# Patient Record
Sex: FEMALE | Race: WHITE
[De-identification: names, ages, dates, MRNs, and addresses within clinical notes are randomized per-mention and may not be internally consistent; named-entity substitution may affect disease eponyms.]

---

## 2018-08-13 ENCOUNTER — HOSPITAL ENCOUNTER (EMERGENCY)
Dept: HOSPITAL 97 - ER | Age: 28
Discharge: HOME | End: 2018-08-13
Payer: SELF-PAY

## 2018-08-13 DIAGNOSIS — R10.11: Primary | ICD-10-CM

## 2018-08-13 DIAGNOSIS — F31.9: ICD-10-CM

## 2018-08-13 DIAGNOSIS — Z88.2: ICD-10-CM

## 2018-08-13 DIAGNOSIS — F17.210: ICD-10-CM

## 2018-08-13 LAB
ALBUMIN SERPL BCP-MCNC: 3.7 G/DL (ref 3.4–5)
ALP SERPL-CCNC: 61 U/L (ref 45–117)
ALT SERPL W P-5'-P-CCNC: 25 U/L (ref 12–78)
AMYLASE SERPL-CCNC: 43 U/L (ref 25–115)
AST SERPL W P-5'-P-CCNC: 19 U/L (ref 15–37)
BUN BLD-MCNC: 18 MG/DL (ref 7–18)
GLUCOSE SERPLBLD-MCNC: 91 MG/DL (ref 74–106)
HCT VFR BLD CALC: 37.9 % (ref 36–45)
LIPASE SERPL-CCNC: 94 U/L (ref 73–393)
LYMPHOCYTES # SPEC AUTO: 1.4 K/UL (ref 0.7–4.9)
MCH RBC QN AUTO: 30.7 PG (ref 27–35)
MCV RBC: 89.1 FL (ref 80–100)
PMV BLD: 10.5 FL (ref 7.6–11.3)
POTASSIUM SERPL-SCNC: 3.9 MMOL/L (ref 3.5–5.1)
RBC # BLD: 4.25 M/UL (ref 3.86–4.86)

## 2018-08-13 PROCEDURE — 83690 ASSAY OF LIPASE: CPT

## 2018-08-13 PROCEDURE — 99284 EMERGENCY DEPT VISIT MOD MDM: CPT

## 2018-08-13 PROCEDURE — 81025 URINE PREGNANCY TEST: CPT

## 2018-08-13 PROCEDURE — 76705 ECHO EXAM OF ABDOMEN: CPT

## 2018-08-13 PROCEDURE — 36415 COLL VENOUS BLD VENIPUNCTURE: CPT

## 2018-08-13 PROCEDURE — 85025 COMPLETE CBC W/AUTO DIFF WBC: CPT

## 2018-08-13 PROCEDURE — 96374 THER/PROPH/DIAG INJ IV PUSH: CPT

## 2018-08-13 PROCEDURE — 81003 URINALYSIS AUTO W/O SCOPE: CPT

## 2018-08-13 PROCEDURE — 80076 HEPATIC FUNCTION PANEL: CPT

## 2018-08-13 PROCEDURE — 80048 BASIC METABOLIC PNL TOTAL CA: CPT

## 2018-08-13 PROCEDURE — 82150 ASSAY OF AMYLASE: CPT

## 2018-08-13 NOTE — ER
Nurse's Notes                                                                                     

 [facility]                                                                

Name: Rigoberto Faye                                                                               

Age: 28 yrs                                                                                       

Sex: Female                                                                                       

: 1990                                                                                   

MRN: [id number]                                                                                   

Arrival Date: 2018                                                                          

Time: 09:44                                                                                       

Account#: [id number]                                                                            

Bed 27                                                                                            

Private MD: None, None                                                                            

Diagnosis: Upper abdominal pain, unspecified                                                      

                                                                                                  

Presentation:                                                                                     

                                                                                             

09:47 Presenting complaint: Patient states: C/o RUQ pain x 3 days, ate sushi on Friday and it jl7 

      started hurting after that. Denies N/V, reports diarrhea on Saturday morning, none          

      since then. Pt noted to be drinking soda and eating donuts, instructed not to eat or        

      drink anything else until the ERP says it's ok. Transition of care: patient was not         

      received from another setting of care. Onset of symptoms was August 10, 2018. Risk          

      Assessment: Do you want to hurt yourself or someone else? Patient reports no desire to      

      harm self or others. Initial Sepsis Screen: Does the patient meet any 2 criteria? No.       

      Patient's initial sepsis screen is negative. Does the patient have a suspected source       

      of infection? No. Patient's initial sepsis screen is negative. Care prior to arrival:       

      None.                                                                                       

09:47 Method Of Arrival: Ambulatory                                                           [facility] 

09:47 Acuity: ANGELES 3                                                                           jl7 

                                                                                                  

OB/GYN:                                                                                           

09:53 LMP 2018                                                                            [facility] 

                                                                                                  

Historical:                                                                                       

- Allergies:                                                                                      

09:53 Sulfa (Sulfonamide Antibiotics);                                                        jl7 

- Home Meds:                                                                                      

09:53 oxcarbazepine oral oral [Active];                                                       jl7 

- PMHx:                                                                                           

09:53 Bipolar disorder;                                                                       jl7 

- PSHx:                                                                                           

09:53 None;                                                                                   jl7 

                                                                                                  

- Immunization history:: Adult Immunizations up to date.                                          

- Social history:: Smoking status: Patient uses tobacco products, smokes one pack                 

  cigarettes per day.                                                                             

- Ebola Screening: : No symptoms or risks identified at this time.                                

                                                                                                  

                                                                                                  

Screening:                                                                                        

10:33 Abuse screen: Denies threats or abuse. Denies injuries from another. Nutritional        aj  

      screening: No deficits noted. Tuberculosis screening: No symptoms or risk factors           

      identified. Fall Risk None identified.                                                      

                                                                                                  

Assessment:                                                                                       

10:14 General: Appears in no apparent distress. comfortable, Behavior is calm, cooperative,   aj  

      appropriate for age. Pain: Complains of pain in epigastric area and right upper             

      quadrant. Neuro: Level of Consciousness is awake, alert, obeys commands, Oriented to        

      person, place, time, situation, Appropriate for age. Respiratory: Airway is patent          

      Respiratory effort is even, unlabored, Respiratory pattern is regular, symmetrical. GI:     

      Abdomen is flat, Bowel sounds present X 4 quads. Abd is soft and non tender X 4 quads.      

      Derm: Skin is intact, is healthy with good turgor, Skin is pink, warm \T\ dry. normal.      

12:09 Reassessment: Patient appears in no apparent distress at this time. No changes from     aj  

      previously documented assessment. Patient and/or family updated on plan of care and         

      expected duration. Pain level reassessed. Patient is alert, oriented x 3, equal             

      unlabored respirations, skin warm/dry/pink. Patient states feeling better. Patient          

      states symptoms have improved.                                                              

                                                                                                  

Vital Signs:                                                                                      

09:53  / 77; Pulse 90; Resp 16 S; Temp 98.3(O); Pulse Ox 99% on R/A; Weight 68.04 kg    jl7 

      (R); Height 5 ft. 10 in. (177.80 cm) (R); Pain 7/10;                                        

11:33  / 83; Pulse 86; Resp 19; Pulse Ox 99% on R/A;                                    aj  

09:53 Body Mass Index 21.52 (68.04 kg, 177.80 cm)                                             jl7 

                                                                                                  

ED Course:                                                                                        

09:44 Patient arrived in ED.                                                                  mr  

09:44 None, None is Private Physician.                                                        mr  

09:52 Triage completed.                                                                       jl7 

09:53 Arm band placed on right wrist.                                                         jl7 

10:03 [facility]                                                        kb  

10:03 Lobito Crews MD is Attending Physician.                                             kb  

10:14 Vanesa Watkins, RN is Primary Nurse.                                                     aj  

10:27 Ultrasound completed.                                                                   hr  

10:31 US Abdomen Limited In Process Unspecified.                                              EDMS

10:33 Patient has correct armband on for positive identification.                             aj  

10:33 Inserted saline lock: 22 gauge in right forearm, using aseptic technique. Blood         aj  

      collected.                                                                                  

11:26 Urine Pregnancy--Ancillary (enter results) Sent.                                        aj  

12:09 No provider procedures requiring assistance completed. IV discontinued, intact,         aj  

      bleeding controlled, No redness/swelling at site. Pressure dressing applied.                

                                                                                                  

Administered Medications:                                                                         

10:33 Drug: ProTONIX 40 mg Route: IVP; Site: right forearm;                                   aj  

11:23 Follow up: Response: No adverse reaction                                                aj  

                                                                                                  

                                                                                                  

Outcome:                                                                                          

11:49 Discharge ordered by MD.                                                                cortney  

12:09 Discharged to home ambulatory, with family.                                             aj  

12:09 Condition: good                                                                             

12:09 Discharge instructions given to patient, Instructed on discharge instructions, follow       

      up and referral plans. medication usage, Demonstrated understanding of instructions,        

      follow-up care, medications, Prescriptions given X 1.                                       

12:11 Patient left the ED.                                                                    aj  

                                                                                                  

Signatures:                                                                                       

Dispatcher [employer]                           EDMS                                                 

Marilee Antonio, FNP-C                 FNP-Vanesa Ham, RN                       RN   Meredith Razo                                mr                                                   

Tiffany Murillo Jahala, RN                        RN   jl7                                                  

                                                                                                  

**************************************************************************************************

## 2018-08-13 NOTE — RAD REPORT
EXAM DESCRIPTION:  US - Abdomen Exam Limited - 8/13/2018 10:31 am

 

CLINICAL HISTORY:  Abdominal pain.

 

COMPARISON:  None.

 

FINDINGS:   The gallbladder wall is not thickened. A gallstone is not seen.

 

The biliary tree is normal caliber.

 

IMPRESSION:  Unremarkable gallbladder ultrasound.

## 2018-08-13 NOTE — EDPHYS
Physician Documentation                                                                           

 [facility]                                                                

Name: Rigoberto Faye                                                                               

Age: 28 yrs                                                                                       

Sex: Female                                                                                       

: 1990                                                                                   

MRN: [id number]                                                                                   

Arrival Date: 2018                                                                          

Time: 09:44                                                                                       

Account#: [id number]                                                                            

Bed 27                                                                                            

Private MD: None, None                                                                            

ED Physician Lobito Crews                                                                      

HPI:                                                                                              

                                                                                             

10:59 This 28 yrs old  Female presents to ER via Ambulatory with complaints of       kb  

      Abdominal Pain.                                                                             

10:59 The patient presents with abdominal pain in the right upper quadrant.                   kb  

11:32 Onset: The symptoms/episode began/occurred 4 day(s) ago. The symptoms radiate to the    kb  

      right shoulder. Associated signs and symptoms: none. The symptoms are described as          

      burning, sharp. Modifying factors: The symptoms are alleviated by nothing, the symptoms     

      are aggravated by food, laying flat. Severity of pain: At its worst the pain was            

      moderate in the emergency department the pain is unchanged. The patient has not             

      experienced similar symptoms in the past. The patient has not recently seen a               

      physician. Pt states she started having RUQ pain that started Friday after eating           

      sushi. States the pain started as epigastric pain that burned up through her chest like     

      indigestion. The pain comes and goes, is worse after eating, when laying flat or when       

      taking a deep breath. Also reports pain to right shoulder. .                                

                                                                                                  

OB/GYN:                                                                                           

09:53 LMP 2018                                                                            jl7 

                                                                                                  

Historical:                                                                                       

- Allergies:                                                                                      

09:53 Sulfa (Sulfonamide Antibiotics);                                                        jl7 

- Home Meds:                                                                                      

09:53 oxcarbazepine oral oral [Active];                                                       jl7 

- PMHx:                                                                                           

09:53 Bipolar disorder;                                                                       jl7 

- PSHx:                                                                                           

09:53 None;                                                                                   jl7 

                                                                                                  

- Immunization history:: Adult Immunizations up to date.                                          

- Social history:: Smoking status: Patient uses tobacco products, smokes one pack                 

  cigarettes per day.                                                                             

- Ebola Screening: : No symptoms or risks identified at this time.                                

                                                                                                  

                                                                                                  

ROS:                                                                                              

11:47 Constitutional: Negative for fever, chills, and weight loss, ENT: Negative for injury,  kb  

      pain, and discharge, Neck: Negative for injury, pain, and swelling, Cardiovascular:         

      Negative for chest pain, palpitations, and edema, Respiratory: Negative for shortness       

      of breath, cough, wheezing, and pleuritic chest pain, Back: Negative for injury and         

      pain, MS/Extremity: Negative for injury and deformity, Skin: Negative for injury, rash,     

      and discoloration, Neuro: Negative for headache, weakness, numbness, tingling, and          

      seizure.                                                                                    

11:47 Abdomen/GI: Positive for abdominal pain, Negative for nausea, vomiting, and diarrhea,       

      constipation, abdominal cramps, abdominal distension, anorexia.                             

                                                                                                  

Exam:                                                                                             

11:47 Constitutional:  This is a well developed, well nourished patient who is awake, alert,  kb  

      and in no acute distress. Head/Face:  Normocephalic, atraumatic. Chest/axilla:  Normal      

      chest wall appearance and motion.  Nontender with no deformity.  No lesions are             

      appreciated. Cardiovascular:  Regular rate and rhythm with a normal S1 and S2.  No          

      gallops, murmurs, or rubs.  Normal PMI, no JVD.  No pulse deficits. Respiratory:  Lungs     

      have equal breath sounds bilaterally, clear to auscultation and percussion.  No rales,      

      rhonchi or wheezes noted.  No increased work of breathing, no retractions or nasal          

      flaring. Back:  No spinal tenderness.  No costovertebral tenderness.  Full range of         

      motion. Skin:  Warm, dry with normal turgor.  Normal color with no rashes, no lesions,      

      and no evidence of cellulitis. MS/ Extremity:  Pulses equal, no cyanosis.                   

      Neurovascular intact.  Full, normal range of motion. Neuro:  Awake and alert, GCS 15,       

      oriented to person, place, time, and situation.  Cranial nerves II-XII grossly intact.      

      Motor strength 5/5 in all extremities.  Sensory grossly intact.  Cerebellar exam            

      normal.  Normal gait.                                                                       

11:47 Abdomen/GI: Inspection: abdomen appears normal, Bowel sounds: normal, in all quadrants,     

      Palpation: soft, in all quadrants, nontender, in the left upper quadrant, right lower       

      quadrant and left lower quadrant, moderate abdominal tenderness, in the right upper         

      quadrant.                                                                                   

                                                                                                  

Vital Signs:                                                                                      

09:53  / 77; Pulse 90; Resp 16 S; Temp 98.3(O); Pulse Ox 99% on R/A; Weight 68.04 kg    7 

      (R); Height 5 ft. 10 in. (177.80 cm) (R); Pain 7/10;                                        

11:33  / 83; Pulse 86; Resp 19; Pulse Ox 99% on R/A;                                    aj  

09:53 Body Mass Index 21.52 (68.04 kg, 177.80 cm)                                             jl7 

                                                                                                  

MDM:                                                                                              

10:03 Patient medically screened.                                                             kb  

11:47 Data reviewed: vital signs, nurses notes. Data interpreted: Pulse oximetry: on room air kb  

      is 99 %. Interpretation: normal. Counseling: I had a detailed discussion with the           

      patient and/or guardian regarding: the historical points, exam findings, and any            

      diagnostic results supporting the discharge/admit diagnosis, lab results, radiology         

      results, the need for outpatient follow up, a gastroenterologist, to return to the          

      emergency department if symptoms worsen or persist or if there are any questions or         

      concerns that arise at home.                                                                

11:50 ED course: Pt reports symptoms resolved after protonix. .                               kb  

                                                                                                  

                                                                                             

10:10 Order name: Amylase, Serum; Complete Time: 11:                                        kb  

                                                                                             

10:10 Order name: Basic Metabolic Panel; Complete Time: :                                 kb  

                                                                                             

10:10 Order name: CBC with Diff; Complete Time: :                                         kb  

                                                                                             

10:10 Order name: Hepatic Function; Complete Time: 11:                                      kb  

                                                                                             

10:10 Order name: Lipase; Complete Time: 11:                                                kb  

                                                                                             

11:18 Order name: Urine Dipstick--Ancillary (enter results)                                   ag  

                                                                                             

10:10 Order name: Urine Pregnancy Test (obtain specimen); Complete Time: 10:34                kb  

                                                                                             

10:10 Order name: IV Saline Lock; Complete Time: 10:34                                        kb  

                                                                                             

10:10 Order name: Labs collected and sent; Complete Time: 10:34                               kb  

                                                                                             

10:10 Order name: Urine Dipstick-Ancillary (obtain specimen); Complete Time: 10:34            kb  

                                                                                             

10:10 Order name: US Abdomen Limited; Complete Time: 11:10                                    kb  

                                                                                             

11:18 Order name: Urine Pregnancy--Ancillary (enter results)                                  ag  

                                                                                                  

Administered Medications:                                                                         

10:33 Drug: ProTONIX 40 mg Route: IVP; Site: right forearm;                                   aj  

11:23 Follow up: Response: No adverse reaction                                                aj  

                                                                                                  

                                                                                                  

Disposition:                                                                                      

                                                                                             

11:17 Co-signature as Attending Physician, Lobito Crews MD I agree with the assessment and  angelina 

      plan of care.                                                                               

                                                                                                  

Disposition:                                                                                      

18 11:49 Discharged to Home. Impression: Upper abdominal pain, unspecified.                 

- Condition is Stable.                                                                            

- Discharge Instructions: Abdominal Pain, Adult, Easy-to-Read.                                    

- Prescriptions for Protonix 40 mg Oral Tablet - take 1 tablet by ORAL route once                 

  daily; 30 tablet.                                                                               

- Medication Reconciliation Form, Thank You Letter, Antibiotic Education, Prescription            

  Opioid Use form.                                                                                

- Follow up: Emergency Department; When: As needed; Reason: Worsening of condition.               

  Follow up: Private Physician; When: 2 - 3 days; Reason: Recheck today's complaints,             

  Continuance of care, Re-evaluation by your physician.                                           

                                                                                                  

                                                                                                  

                                                                                                  

Signatures:                                                                                       

Dispatcher [employer]                           EDMarilee Waterman, AMINTA-C                 AMINTA-Vanesa Ham, RN                       RN   Lobito High MD MD cha Leal, Jahala, RN                        RN   jl7                                                  

                                                                                                  

Corrections: (The following items were deleted from the chart)                                    

                                                                                             

12:11 11:49 2018 11:49 Discharged to Home. Impression: Upper abdominal pain,            aj  

      unspecified. Condition is Stable. Forms are Medication Reconciliation Form, Thank You       

      Letter, Antibiotic Education, Prescription Opioid Use. Follow up: Emergency Department;     

      When: As needed; Reason: Worsening of condition. Follow up: Private Physician; When: 2      

      - 3 days; Reason: Recheck today's complaints, Continuance of care, Re-evaluation by         

      your physician. kb                                                                          

                                                                                                  

**************************************************************************************************

## 2018-09-10 ENCOUNTER — HOSPITAL ENCOUNTER (EMERGENCY)
Dept: HOSPITAL 97 - ER | Age: 28
Discharge: HOME | End: 2018-09-10
Payer: SELF-PAY

## 2018-09-10 DIAGNOSIS — N83.202: Primary | ICD-10-CM

## 2018-09-10 DIAGNOSIS — F17.210: ICD-10-CM

## 2018-09-10 DIAGNOSIS — D25.9: ICD-10-CM

## 2018-09-10 DIAGNOSIS — Z88.2: ICD-10-CM

## 2018-09-10 LAB
ALBUMIN SERPL BCP-MCNC: 3.9 G/DL (ref 3.4–5)
ALP SERPL-CCNC: 70 U/L (ref 45–117)
ALT SERPL W P-5'-P-CCNC: 23 U/L (ref 12–78)
AST SERPL W P-5'-P-CCNC: 11 U/L (ref 15–37)
BUN BLD-MCNC: 19 MG/DL (ref 7–18)
GLUCOSE SERPLBLD-MCNC: 85 MG/DL (ref 74–106)
HCT VFR BLD CALC: 39.1 % (ref 36–45)
LIPASE SERPL-CCNC: 118 U/L (ref 73–393)
LYMPHOCYTES # SPEC AUTO: 1.3 K/UL (ref 0.7–4.9)
MCH RBC QN AUTO: 30.6 PG (ref 27–35)
MCV RBC: 88.5 FL (ref 80–100)
PMV BLD: 10.2 FL (ref 7.6–11.3)
POTASSIUM SERPL-SCNC: 3.7 MMOL/L (ref 3.5–5.1)
RBC # BLD: 4.42 M/UL (ref 3.86–4.86)

## 2018-09-10 PROCEDURE — 74177 CT ABD & PELVIS W/CONTRAST: CPT

## 2018-09-10 PROCEDURE — 81003 URINALYSIS AUTO W/O SCOPE: CPT

## 2018-09-10 PROCEDURE — 81025 URINE PREGNANCY TEST: CPT

## 2018-09-10 PROCEDURE — 80076 HEPATIC FUNCTION PANEL: CPT

## 2018-09-10 PROCEDURE — 99284 EMERGENCY DEPT VISIT MOD MDM: CPT

## 2018-09-10 PROCEDURE — 83690 ASSAY OF LIPASE: CPT

## 2018-09-10 PROCEDURE — 96374 THER/PROPH/DIAG INJ IV PUSH: CPT

## 2018-09-10 PROCEDURE — 36415 COLL VENOUS BLD VENIPUNCTURE: CPT

## 2018-09-10 PROCEDURE — 85025 COMPLETE CBC W/AUTO DIFF WBC: CPT

## 2018-09-10 PROCEDURE — 80048 BASIC METABOLIC PNL TOTAL CA: CPT

## 2018-09-10 NOTE — ER
Nurse's Notes                                                                                     

 [facility]                                                                

Name: Rigoberto Faye                                                                               

Age: 28 yrs                                                                                       

Sex: Female                                                                                       

: 1990                                                                                   

MRN: [id number]                                                                                   

Arrival Date: 09/10/2018                                                                          

Time: 10:43                                                                                       

Account#: [id number]                                                                            

Bed 18                                                                                            

Private MD: None, None                                                                            

Diagnosis: Unspecified abdominal pain;Left ovarian cyst;Uterine fibroids                          

                                                                                                  

Presentation:                                                                                     

09/10                                                                                             

10:58 Presenting complaint: Patient states: RUQ pain that began last night. Pt denies N/V/D.  aa5 

      Pt states "I was seen here about a month ago and they checked my gallbladder but they       

      couldn't find anything wrong". Transition of care: patient was not received from            

      another setting of care. Onset of symptoms was 2018. Risk Assessment: Do you      

      want to hurt yourself or someone else? Patient reports no desire to harm self or            

      others. Initial Sepsis Screen: Does the patient meet any 2 criteria? No. Patient's          

      initial sepsis screen is negative. Does the patient have a suspected source of              

      infection? No. Patient's initial sepsis screen is negative. Care prior to arrival: None.    

10:58 Method Of Arrival: Ambulatory                                                           aa5 

10:58 Acuity: ANGELES 3                                                                           aa5 

                                                                                                  

OB/GYN:                                                                                           

10:59 LMP 2018                                                                            aa5 

                                                                                                  

Historical:                                                                                       

- Allergies:                                                                                      

10:59 Sulfa (Sulfonamide Antibiotics);                                                        aa5 

- PMHx:                                                                                           

10:59 Bipolar disorder;                                                                       aa5 

- PSHx:                                                                                           

10:59 None;                                                                                   aa5 

                                                                                                  

- Immunization history:: Adult Immunizations up to date.                                          

- Social history:: Smoking status: Patient uses tobacco products, smokes one pack                 

  cigarettes per day.                                                                             

- Ebola Screening: : No symptoms or risks identified at this time.                                

                                                                                                  

                                                                                                  

Screenin:36 Abuse screen: Denies threats or abuse. Denies injuries from another. Nutritional        jl7 

      screening: No deficits noted. Tuberculosis screening: No symptoms or risk factors           

      identified. Fall Risk IV access (20 points). Total Wilkins Fall Scale indicates No Risk       

      (0-24 pts).                                                                                 

                                                                                                  

Assessment:                                                                                       

11:15 General: Appears in no apparent distress. uncomfortable, Behavior is calm, cooperative, jl7 

      appropriate for age. Pain: Complains of pain in right upper quadrant Pain radiates to       

      right mid back Pain currently is 6 out of 10 on a pain scale. Quality of pain is            

      described as aching. Neuro: Level of Consciousness is awake, alert, obeys commands,         

      Oriented to person, place, time, situation. Cardiovascular: Patient's skin is warm and      

      dry. Respiratory: Airway is patent Respiratory effort is even, unlabored, Respiratory       

      pattern is regular, symmetrical. GI: Abdomen is round non-distended, Bowel sounds           

      present X 4 quads. Abd is soft X 4 quads Abdomen is tender to palpation in right upper      

      quadrant and right lower quadrant. : No signs and/or symptoms were reported regarding     

      the genitourinary system. EENT: No signs and/or symptoms were reported regarding the        

      EENT system. Derm: Skin is pink, warm \T\ dry. Musculoskeletal: No signs and/or symptoms    

      reported regarding the musculoskeletal system.                                              

12:15 Reassessment: No changes from previously documented assessment. Patient and/or family   jl7 

      updated on plan of care and expected duration. Pain level reassessed. Patient is alert,     

      oriented x 3, equal unlabored respirations, skin warm/dry/pink.                             

                                                                                                  

Vital Signs:                                                                                      

10:59  / 74; Pulse 85; Resp 18 S; Temp 97.0(TE); Pulse Ox 98% on R/A; Weight 84.82 kg   aa5 

      (R); Height 5 ft. 10 in. (177.80 cm) (R); Pain 6/10;                                        

11:36  / 66; Pulse 81; Resp 16; Pulse Ox 97% ; Pain 6/10;                               jl7 

13:15  / 70; Pulse 80; Resp 16 S; Pulse Ox 98% on R/A;                                  jl7 

10:59 Body Mass Index 26.83 (84.82 kg, 177.80 cm)                                             aa5 

                                                                                                  

ED Course:                                                                                        

10:43 Patient arrived in ED.                                                                  mr  

10:43 None, None is Private Physician.                                                        mr  

10:59 Triage completed.                                                                       aa5 

10:59 Arm band placed on.                                                                     aa5 

11:02 Ramone Farmer NP is PHCP.                                                           pm1 

11:02 Griffin Adler MD is Attending Physician.                                                pm1 

11:10 Orestes Jorge RN is Primary Nurse.                                                      jl7 

11:20 Initial lab(s) drawn, by me, sent to lab. Inserted saline lock: 22 gauge in right       hj  

      forearm, using aseptic technique. Blood collected.                                          

11:28 Urine collected: clean catch specimen, clear.                                           mh5 

11:36 Patient has correct armband on for positive identification. Placed in gown. Bed in low  jl7 

      position. Call light in reach. Side rails up X 1. Pulse ox on. NIBP on. Warm blanket        

      given.                                                                                      

11:56 Radiology exam delayed due to lab results not completed at this time. (BUN/Creatinine).   

12:10 CT completed. Patient tolerated procedure well. Patient moved to CT via wheelchair.     jg6 

      Patient moved back from CT.                                                                 

12:11 Abdomen In Process Unspecified.                                                         EDMS

13:01 Jose Alberto Romero MD is Referral Physician.                                           pm1 

13:15 No provider procedures requiring assistance completed. IV discontinued, intact,         jl7 

      bleeding controlled, No redness/swelling at site. Pressure dressing applied.                

                                                                                                  

Administered Medications:                                                                         

13:05 Drug: Pepcid 10 mg Route: IVP; Site: right forearm;                                     jl7 

13:18 Follow up: Response: No adverse reaction; Pain is decreased                             jl7 

13:06 Drug: GI Cocktail without Donnatal - (Maalox Suspension 30 ml, Lidocaine Liquid 2 % 15  jl7 

      ml) Route: PO;                                                                              

13:18 Follow up: Response: No adverse reaction; Pain is decreased                             jl7 

                                                                                                  

                                                                                                  

Outcome:                                                                                          

13:03 Discharge ordered by MD.                                                                pm1 

13:15 Discharged to home ambulatory.                                                          jl7 

13:15 Condition: stable                                                                           

13:15 Discharge instructions given to patient, family, Instructed on discharge instructions,      

      follow up and referral plans. Demonstrated understanding of instructions, follow-up         

      care.                                                                                       

13:19 Patient left the ED.                                                                    jl7 

                                                                                                  

Signatures:                                                                                       

Dispatcher [employer]                           EDMS                                                 

Meredith Qureshi                                                   

MehrdadRosalba, RN                     RN   [email or web address]                                                  

Muna Donovan                                                                                 

Ever Milligan, RN                      Ramone Thomas, NP                    NP   [facility]                                                  

Meredith Baker                              Orestes Saldana RN RN   jl7                                                  

Genesis Cavazos                              [facility]                                                  

                                                                                                  

**************************************************************************************************

## 2018-09-10 NOTE — EDPHYS
Physician Documentation                                                                           

 [facility]                                                                

Name: Rigoberto Faye                                                                               

Age: 28 yrs                                                                                       

Sex: Female                                                                                       

: 1990                                                                                   

MRN: [id number]                                                                                   

Arrival Date: 09/10/2018                                                                          

Time: 10:43                                                                                       

Account#: [id number]                                                                            

Bed 18                                                                                            

Private MD: None, None                                                                            

ED Physician Griffin Adler                                                                         

HPI:                                                                                              

09/10                                                                                             

12:00 This 28 yrs old  Female presents to ER via Ambulatory with complaints of       pm1 

      Abdominal Pain.                                                                             

12:00 The patient presents with abdominal pain in the right upper quadrant. Onset: The        pm1 

      symptoms/episode began/occurred yesterday. The symptoms do not radiate. Associated          

      signs and symptoms: Pertinent negatives: nausea, vomiting, and diarrhea, chest pain,        

      shortness of breath. The symptoms are described as achy. Modifying factors: The             

      symptoms are alleviated by nothing, the symptoms are aggravated by food, onset after        

      eating pizza last night. Severity of pain: in the emergency department the pain has         

      improved. The patient has experienced similar episodes in the past, a few times,            

      today's symptoms are similar, prior ER visit for abdominal pain last month.                 

                                                                                                  

OB/GYN:                                                                                           

10:59 LMP 2018                                                                            aa5 

                                                                                                  

Historical:                                                                                       

- Allergies:                                                                                      

10:59 Sulfa (Sulfonamide Antibiotics);                                                        aa5 

- PMHx:                                                                                           

10:59 Bipolar disorder;                                                                       aa5 

- PSHx:                                                                                           

10:59 None;                                                                                   aa5 

                                                                                                  

- Immunization history:: Adult Immunizations up to date.                                          

- Social history:: Smoking status: Patient uses tobacco products, smokes one pack                 

  cigarettes per day.                                                                             

- Ebola Screening: : No symptoms or risks identified at this time.                                

                                                                                                  

                                                                                                  

ROS:                                                                                              

12:00 Constitutional: Negative for fever, chills, and weight loss, Eyes: Negative for injury, pm1 

      pain, redness, and discharge, ENT: Negative for injury, pain, and discharge, Neck:          

      Negative for injury, pain, and swelling, Cardiovascular: Negative for chest pain,           

      palpitations, and edema, Respiratory: Negative for shortness of breath, cough,              

      wheezing, and pleuritic chest pain, Back: Negative for injury and pain.                     

12:00 : Negative for injury, bleeding, discharge, and swelling, MS/Extremity: Negative for      

      injury and deformity, Skin: Negative for injury, rash, and discoloration, Neuro:            

      Negative for headache, weakness, numbness, tingling, and seizure.                           

12:00 Abdomen/GI: Positive for abdominal pain, Negative for nausea, vomiting, and diarrhea.       

                                                                                                  

Exam:                                                                                             

12:00 Constitutional:  This is a well developed, well nourished patient who is awake, alert,  pm1 

      and in no acute distress. Head/Face:  Normocephalic, atraumatic. Eyes:  Pupils equal        

      round and reactive to light, extra-ocular motions intact.  Lids and lashes normal.          

      Conjunctiva and sclera are non-icteric and not injected.  Cornea within normal limits.      

      Periorbital areas with no swelling, redness, or edema. ENT:  Nares patent. No nasal         

      discharge, no septal abnormalities noted.  Tympanic membranes are normal and external       

      auditory canals are clear.  Oropharynx with no redness, swelling, or masses, exudates,      

      or evidence of obstruction, uvula midline.  Mucous membranes moist. Neck:  Trachea          

      midline, no thyromegaly or masses palpated, and no cervical lymphadenopathy.  Supple,       

      full range of motion without nuchal rigidity, or vertebral point tenderness.  No            

      Meningismus. Chest/axilla:  Normal chest wall appearance and motion.  Nontender with no     

      deformity.  No lesions are appreciated. Cardiovascular:  Regular rate and rhythm with a     

      normal S1 and S2.  No gallops, murmurs, or rubs.  Normal PMI, no JVD.  No pulse             

      deficits. Respiratory:  Lungs have equal breath sounds bilaterally, clear to                

      auscultation and percussion.  No rales, rhonchi or wheezes noted.  No increased work of     

      breathing, no retractions or nasal flaring. Back:  No spinal tenderness.  No                

      costovertebral tenderness.  Full range of motion. Skin:  Warm, dry with normal turgor.      

      Normal color with no rashes, no lesions, and no evidence of cellulitis. MS/ Extremity:      

      Pulses equal, no cyanosis.  Neurovascular intact.  Full, normal range of motion.            

12:00 Abdomen/GI: Inspection: abdomen appears normal, Bowel sounds: normal, Palpation: soft,      

      mild abdominal tenderness, in the right upper quadrant, mass, is not appreciated,           

      rebound tenderness, is not appreciated, Indicators: McBurney's point is not tender,         

      Sullivan's sign is negative, Rovsing's sign is negative, Obturator sign is negative,          

      Psoas sign is negative.                                                                     

12:00 Neuro: Orientation: is normal, Motor: is normal.                                            

                                                                                                  

Vital Signs:                                                                                      

10:59  / 74; Pulse 85; Resp 18 S; Temp 97.0(TE); Pulse Ox 98% on R/A; Weight 84.82 kg   aa5 

      (R); Height 5 ft. 10 in. (177.80 cm) (R); Pain 6/10;                                        

11:36  / 66; Pulse 81; Resp 16; Pulse Ox 97% ; Pain 6/10;                               jl7 

13:15  / 70; Pulse 80; Resp 16 S; Pulse Ox 98% on R/A;                                  jl7 

10:59 Body Mass Index 26.83 (84.82 kg, 177.80 cm)                                             aa5 

                                                                                                  

MDM:                                                                                              

11:03 Patient medically screened.                                                             pm1 

13:00 Data reviewed: vital signs. Data interpreted: Pulse oximetry: on room air is 97 %.      pm1 

      Interpretation: normal. Counseling: I had a detailed discussion with the patient and/or     

      guardian regarding: the historical points, exam findings, and any diagnostic results        

      supporting the discharge/admit diagnosis, lab results, radiology results, the need for      

      outpatient follow up, an OB/Gyne specialist, uterine fibroid and left ovarian cyst, to      

      return to the emergency department if symptoms worsen or persist or if there are any        

      questions or concerns that arise at home.                                                   

13:30 ED course: Patient with improvement from GI cocktail, impression likely reflux disease  pm1 

      or ulcer. Recommend follow up with GI.                                                      

                                                                                                  

09/10                                                                                             

11:02 Order name: Basic Metabolic Panel; Complete Time: 12:05                                 pm1 

09/10                                                                                             

11:02 Order name: CBC with Diff; Complete Time: 12:05                                         pm1 

09/10                                                                                             

11:02 Order name: Hepatic Function; Complete Time: 12:05                                      pm1 

09/10                                                                                             

11:02 Order name: Lipase; Complete Time: 12:05                                                pm1 

09/10                                                                                             

11:30 Order name: Urine Dipstick--Ancillary (enter results); Complete Time: 11:44             bd  

09/10                                                                                             

11:30 Order name: Urine Pregnancy--Ancillary (enter results); Complete Time: 11:44            bd  

09/10                                                                                             

11:02 Order name: Urine Pregnancy Test (obtain specimen); Complete Time: 11:24                pm1 

09/10                                                                                             

11:02 Order name: IV Saline Lock; Complete Time: 11:24                                        pm1 

09/10                                                                                             

11:02 Order name: Labs collected and sent; Complete Time: 11:24                               pm1 

09/10                                                                                             

11:02 Order name: Urine Dipstick-Ancillary (obtain specimen); Complete Time: 11:24            pm1 

09/10                                                                                             

11:48 Order name: Abdomen ; Complete Time: 12:39                                              EDMS

                                                                                                  

Administered Medications:                                                                         

13:05 Drug: Pepcid 10 mg Route: IVP; Site: right forearm;                                     jl7 

13:18 Follow up: Response: No adverse reaction; Pain is decreased                             jl7 

13:06 Drug: GI Cocktail without Donnatal - (Maalox Suspension 30 ml, Lidocaine Liquid 2 % 15  jl7 

      ml) Route: PO;                                                                              

13:18 Follow up: Response: No adverse reaction; Pain is decreased                             jl7 

                                                                                                  

                                                                                                  

Disposition:                                                                                      

13:28 Co-signature as Attending Physician, Griffin Adler MD.                                    rn  

                                                                                                  

Disposition:                                                                                      

09/10/18 13:03 Discharged to Home. Impression: Unspecified abdominal pain, Left ovarian cyst,     

  Uterine fibroids.                                                                               

- Condition is Stable.                                                                            

- Discharge Instructions: Abdominal Pain, Adult, Uterine Fibroids, Ovarian Cyst.                  

                                                                                                  

- Medication Reconciliation Form, Thank You Letter, Antibiotic Education, Prescription            

  Opioid Use form.                                                                                

- Follow up: Emergency Department; When: As needed; Reason: Worsening of condition.               

  Follow up: Private Physician; When: 2 - 3 days; Reason: Recheck today's complaints,             

  Continuance of care, Re-evaluation by your physician. Follow up: Jose Alberto Romero MD; When: 2 - 3 days; Reason: Recheck today's complaints, Continuance of care,                  

  Re-evaluation by your physician.                                                                

- Problem is new.                                                                                 

- Symptoms have improved.                                                                         

                                                                                                  

                                                                                                  

                                                                                                  

Signatures:                                                                                       

Dispatcher [employer]                           EDMS                                                 

Griffin Adler MD MD rn Calderon, Audri, RN                     RN   aa5                                                  

Ramone Farmer NP                    NP   pm1                                                  

Orestes Jorge RN                        RN   jl7                                                  

                                                                                                  

Corrections: (The following items were deleted from the chart)                                    

11:48 11:31 [facility]CT.RAD.BRZ ordered. [facility]                                           EDMS

13:19 13:03 09/10/2018 13:03 Discharged to Home. Impression: Unspecified abdominal pain; Left jl7 

      ovarian cyst; Uterine fibroids. Condition is Stable. Forms are Medication                   

      Reconciliation Form, Thank You Letter, Antibiotic Education, Prescription Opioid Use.       

      Follow up: Emergency Department; When: As needed; Reason: Worsening of condition.           

      Follow up: Private Physician; When: 2 - 3 days; Reason: Recheck today's complaints,         

      Continuance of care, Re-evaluation by your physician. Follow up: Jose Alberto Romero;         

      When: 2 - 3 days; Reason: Recheck today's complaints, Continuance of care,                  

      Re-evaluation by your physician. Problem is new. Symptoms have improved. pm1                

                                                                                                  

**************************************************************************************************

## 2018-09-10 NOTE — RAD REPORT
EXAM DESCRIPTION:  CT - Abdomen   Pelvis W Contrast - 9/10/2018 12:11 pm

 

CLINICAL HISTORY:  Abdominal pain

 

COMPARISON:  None.

 

TECHNIQUE:  Biphasic, helical CT imaging of the abdomen and pelvis was performed following 100 ml non
-ionic IV contrast. Oral contrast was given.

 

All CT scans are performed using dose optimization technique as appropriate and may include automated
 exposure control or mA/KV adjustment according to patient size.

 

FINDINGS:  No suspicious findings in the lung bases.

 

The liver, spleen, and pancreas show no suspicious findings. Gallbladder and biliary tree are also wi
thout suspicious finding.

 

Symmetric renal function is seen with no hydronephrosis or suspicious renal mass. Renal parenchymal e
nhancement is slightly heterogeneous but no convincing evidence for pyelonephritis or other active re
nal parenchymal process. Urinary bladder is contracted. No bladder calculus.

 

Uterus is enlarged and lobulated. There are numerous small hyperdense areas within the myometrium. Mo
st are 8-15 mm in size. Largest is lateral right fundus a 3.2 cm in size. Right ovary is unremarkable
. In the left adnexa there is a 5.2 centimeter thin-walled cyst. This is probably a benign ovarian cy
st but is relatively large. Mural nodule, septation calcification component. Stage component. Mild di
latation of the left fallopian tube is suspected as well. No evidence for cyst rupture or hemorrhage.


 

No dilated bowel loops or bowel wall thickening. Moderate stool volume is present throughout otherwis
e unremarkable colon. No appendicitis findings. No free air, free fluid or inflammatory stranding.  N
o hernia, mass or bulky lymphadenopathy. No adrenal abnormality.

 

No suspicious bony findings.

 

 

IMPRESSION:  A 5.2 centimeter thin-walled homogeneous fluid attenuation cyst or mass is present in th
e left adnexa. Left fallopian tube appears to be mildly dilated as well.   No cyst hemorrhage or rupt
ure.

 

The dominant mass is most likely a benign ovarian or paraovarian cyst. Size does warrant ongoing cam
toring. Fallopian tube dilatation is likely chronic. PID or other active process is unlikely.

 

Enlarged bulky multi fibroid uterus.

 

No acute GI or  process. There is moderate stool volume filling the colon.

## 2019-06-06 ENCOUNTER — HOSPITAL ENCOUNTER (EMERGENCY)
Dept: HOSPITAL 97 - ER | Age: 29
Discharge: HOME | End: 2019-06-06
Payer: SELF-PAY

## 2019-06-06 DIAGNOSIS — N39.0: ICD-10-CM

## 2019-06-06 DIAGNOSIS — F31.9: ICD-10-CM

## 2019-06-06 DIAGNOSIS — Z88.2: ICD-10-CM

## 2019-06-06 DIAGNOSIS — K30: Primary | ICD-10-CM

## 2019-06-06 LAB
ALBUMIN SERPL BCP-MCNC: 4.1 G/DL (ref 3.4–5)
ALP SERPL-CCNC: 78 U/L (ref 45–117)
ALT SERPL W P-5'-P-CCNC: 19 U/L (ref 12–78)
AST SERPL W P-5'-P-CCNC: 17 U/L (ref 15–37)
BUN BLD-MCNC: 16 MG/DL (ref 7–18)
GLUCOSE SERPLBLD-MCNC: 84 MG/DL (ref 74–106)
HCT VFR BLD CALC: 40.7 % (ref 36–45)
LIPASE SERPL-CCNC: 77 U/L (ref 73–393)
LYMPHOCYTES # SPEC AUTO: 1.3 K/UL (ref 0.7–4.9)
PMV BLD: 10.1 FL (ref 7.6–11.3)
POTASSIUM SERPL-SCNC: 3.8 MMOL/L (ref 3.5–5.1)
RBC # BLD: 4.57 M/UL (ref 3.86–4.86)

## 2019-06-06 PROCEDURE — 87086 URINE CULTURE/COLONY COUNT: CPT

## 2019-06-06 PROCEDURE — 80048 BASIC METABOLIC PNL TOTAL CA: CPT

## 2019-06-06 PROCEDURE — 96361 HYDRATE IV INFUSION ADD-ON: CPT

## 2019-06-06 PROCEDURE — 96374 THER/PROPH/DIAG INJ IV PUSH: CPT

## 2019-06-06 PROCEDURE — 96375 TX/PRO/DX INJ NEW DRUG ADDON: CPT

## 2019-06-06 PROCEDURE — 85025 COMPLETE CBC W/AUTO DIFF WBC: CPT

## 2019-06-06 PROCEDURE — 81003 URINALYSIS AUTO W/O SCOPE: CPT

## 2019-06-06 PROCEDURE — 87088 URINE BACTERIA CULTURE: CPT

## 2019-06-06 PROCEDURE — 83690 ASSAY OF LIPASE: CPT

## 2019-06-06 PROCEDURE — 87077 CULTURE AEROBIC IDENTIFY: CPT

## 2019-06-06 PROCEDURE — 99284 EMERGENCY DEPT VISIT MOD MDM: CPT

## 2019-06-06 PROCEDURE — 87186 SC STD MICRODIL/AGAR DIL: CPT

## 2019-06-06 PROCEDURE — 81025 URINE PREGNANCY TEST: CPT

## 2019-06-06 PROCEDURE — 36415 COLL VENOUS BLD VENIPUNCTURE: CPT

## 2019-06-06 PROCEDURE — 80076 HEPATIC FUNCTION PANEL: CPT

## 2019-06-06 NOTE — ER
Nurse's Notes                                                                                     

 [facility]                                                                 

Name: Rigoberto Faye                                                                               

Age: 29 yrs                                                                                       

Sex: Female                                                                                       

: 1990                                                                                   

MRN: [id number]                                                                                   

Arrival Date: 2019                                                                          

Time: 13:32                                                                                       

Account#: [id number]                                                                            

Bed 17                                                                                            

Private MD:                                                                                       

Diagnosis: Abdominal tenderness;Functional dyspepsia;Urinary tract infection, site not specified  

                                                                                                  

Presentation:                                                                                     

                                                                                             

13:35 Presenting complaint: Patient states: RUQ pain that started after patient ate. Patient  aj  

      states "I think it's my gallbladder again but I wasn't able to follow up. The last time     

      they gave me a GI cocktail and it helped a lot.". Transition of care: patient was not       

      received from another setting of care. Onset of symptoms was 2019. Risk            

      Assessment: Do you want to hurt yourself or someone else? Patient reports no desire to      

      harm self or others. Initial Sepsis Screen: Does the patient meet any 2 criteria? No.       

      Patient's initial sepsis screen is negative. Does the patient have a suspected source       

      of infection? No. Patient's initial sepsis screen is negative. Care prior to arrival:       

      None.                                                                                       

13:35 Method Of Arrival: Ambulatory                                                             

13:35 Acuity: ANGELES 3                                                                           aj  

                                                                                                  

Triage Assessment:                                                                                

13:36 General: Appears in no apparent distress. comfortable, Behavior is calm, cooperative,   aj  

      appropriate for age. Pain: Complains of pain in epigastric area and right upper             

      quadrant. Neuro: Level of Consciousness is awake, alert, obeys commands, Oriented to        

      person, place, time, situation, Appropriate for age. Respiratory: Airway is patent          

      Respiratory effort is even, unlabored, Respiratory pattern is regular, symmetrical. GI:     

      Abdomen is flat. Derm: Skin is intact, is healthy with good turgor, Skin is pink, warm      

      \T\ dry. normal.                                                                            

                                                                                                  

Historical:                                                                                       

- Allergies:                                                                                      

13:36 Sulfa (Sulfonamide Antibiotics);                                                        aj  

- Home Meds:                                                                                      

13:36 oxcarbazepine Oral [Active];                                                            aj  

- PMHx:                                                                                           

13:36 Bipolar disorder;                                                                       aj  

- PSHx:                                                                                           

13:36 None;                                                                                   aj  

                                                                                                  

- Immunization history:: Adult Immunizations up to date.                                          

- Social history:: Smoking status: Patient/guardian denies using tobacco.                         

- Ebola Screening: : Patient negative for fever greater than or equal to 101.5 degrees            

  Fahrenheit, and additional compatible Ebola Virus Disease symptoms Patient denies               

  exposure to infectious person Patient denies travel to an Ebola-affected area in the            

  21 days before illness onset No symptoms or risks identified at this time.                      

- Family history:: not pertinent.                                                                 

                                                                                                  

                                                                                                  

Screenin:45 Abuse screen: Denies threats or abuse. Denies injuries from another. Nutritional        bp  

      screening: No deficits noted. Tuberculosis screening: No symptoms or risk factors           

      identified. Fall Risk None identified.                                                      

                                                                                                  

Assessment:                                                                                       

13:45 General: SEE TRIAGE NOTE.                                                               bp  

13:45 GI: Bowel sounds present X 4 quads. Abd is soft and non tender.                         bp  

16:01 Reassessment: PT D/C HOME AMBULATORY, DX WITH FUNCTIONAL DYSPEPSIA AND UTI.             bp  

                                                                                                  

Vital Signs:                                                                                      

13:36  / 71; Pulse 79; Resp 18; Temp 98.2; Pulse Ox 96% on R/A; Weight 89.81 kg; Height aj  

      5 ft. 10 in. (177.80 cm);                                                                   

14:41 BP 94 / 69; Pulse 71; Resp 16; Temp 98.1(O); Pulse Ox 97% on R/A;                       mh5 

15:29  / 72; Pulse 70; Resp 16; Temp 97.9(O); Pulse Ox 97% on R/A;                      mh5 

13:36 Body Mass Index 28.41 (89.81 kg, 177.80 cm)                                               

                                                                                                  

ED Course:                                                                                        

13:32 Patient arrived in ED.                                                                  ds1 

13:36 Triage completed.                                                                       aj  

13:36 Arm band placed on right wrist. Patient placed in an exam room.                         aj  

13:46 Lobito Crews MD is Attending Physician.                                             angelina 

13:46 Patient has correct armband on for positive identification. Bed in low position. Call   mh5 

      light in reach. Side rails up X 1. Warm blanket given. Pulse ox on. NIBP on.                

13:52 Yosef Whiteside, RN is Primary Nurse.                                                    bp  

14:30 Inserted saline lock: 22 gauge in right hand, using aseptic technique. Blood collected. bp  

15:37 Chris Ceja MD is Referral Physician.                                              angelina 

16:02 No provider procedures requiring assistance completed. IV discontinued, intact,         bp  

      bleeding controlled, No redness/swelling at site. Pressure dressing applied.                

                                                                                                  

Administered Medications:                                                                         

14:30 Drug: NS 0.9% 1000 ml Route: IV; Rate: 1 bolus; Site: right hand;                       bp  

16:04 Follow up: IV Status: Completed infusion; IV Intake: 1000ml                             bp  

14:30 Drug: GI Cocktail without Donnatal - (Maalox Suspension 30 ml, Lidocaine Liquid 2 % 15  bp  

      ml) Route: PO;                                                                              

15:23 Follow up: Response: No adverse reaction                                                bp  

14:30 Drug: Pepcid 20 mg Route: IVP; Site: right hand;                                        bp  

15:22 Follow up: Response: No adverse reaction                                                bp  

15:30 Drug: Rocephin - (cefTRIAXone) 1 grams Route: IVPB; Infused Over: 30 mins; Site: right  bp  

      hand;                                                                                       

15:39 Follow up: IV Status: Completed infusion                                                bp  

                                                                                                  

                                                                                                  

Intake:                                                                                           

16:04 IV: 1000ml; Total: 1000ml.                                                              bp  

                                                                                                  

Outcome:                                                                                          

15:37 Discharge ordered by MD.                                                                angelina 

16:03 Discharged to home ambulatory, with family.                                             bp  

16:03 Condition: stable                                                                           

16:03 Discharge instructions given to patient, Instructed on discharge instructions, follow       

      up and referral plans. medication usage, Demonstrated understanding of instructions,        

      follow-up care, medications, Prescriptions given X 4.                                       

16:05 Patient left the ED.                                                                    bp  

                                                                                                  

Signatures:                                                                                       

Vanesa Watkins, RN                       RN   Lobito High MD MD cha Sanford, Demi                                ds1                                                  

Meredith Baker                              5                                                  

Yosef Whiteside, RN                      RN   bp                                                   

                                                                                                  

**************************************************************************************************

## 2019-06-06 NOTE — EDPHYS
Physician Documentation                                                                           

 [facility]                                                                 

Name: Rigoberto Faye                                                                               

Age: 29 yrs                                                                                       

Sex: Female                                                                                       

: 1990                                                                                   

MRN: [id number]                                                                                   

Arrival Date: 2019                                                                          

Time: 13:32                                                                                       

Account#: [id number]                                                                            

Bed 17                                                                                            

Private MD:                                                                                       

ED Physician Lobito Crews                                                                      

HPI:                                                                                              

                                                                                             

14:03 This 29 yrs old  Female presents to ER via Ambulatory with complaints of       angelina 

      Abdominal Pain.                                                                             

14:03 The patient presents with abdominal pain in the upper abdomen. Onset: The               angelina 

      symptoms/episode began/occurred 1 day(s) ago. The symptoms do not radiate. Associated       

      signs and symptoms: Pertinent positives: nausea. The symptoms are described as crampy.      

      Modifying factors: The symptoms are alleviated by nothing, the symptoms are aggravated      

      by food. Severity of pain: At its worst the pain was mild moderate in the emergency         

      department the pain has improved mildly. The patient has experienced similar episodes       

      in the past, a few times.                                                                   

                                                                                                  

Historical:                                                                                       

- Allergies:                                                                                      

13:36 Sulfa (Sulfonamide Antibiotics);                                                        aj  

- Home Meds:                                                                                      

13:36 oxcarbazepine Oral [Active];                                                            aj  

- PMHx:                                                                                           

13:36 Bipolar disorder;                                                                       aj  

- PSHx:                                                                                           

13:36 None;                                                                                   aj  

                                                                                                  

- Immunization history:: Adult Immunizations up to date.                                          

- Social history:: Smoking status: Patient/guardian denies using tobacco.                         

- Ebola Screening: : Patient negative for fever greater than or equal to 101.5 degrees            

  Fahrenheit, and additional compatible Ebola Virus Disease symptoms Patient denies               

  exposure to infectious person Patient denies travel to an Ebola-affected area in the            

  21 days before illness onset No symptoms or risks identified at this time.                      

- Family history:: not pertinent.                                                                 

                                                                                                  

                                                                                                  

ROS:                                                                                              

14:03 Constitutional: Negative for fever, chills, and weight loss, Eyes: Negative for injury, angelina 

      pain, redness, and discharge, ENT: Negative for injury, pain, and discharge, Neck:          

      Negative for injury, pain, and swelling, Cardiovascular: Negative for chest pain,           

      palpitations, and edema, Respiratory: Negative for shortness of breath, cough,              

      wheezing, and pleuritic chest pain, Back: Negative for injury and pain, : Negative        

      for injury, bleeding, discharge, and swelling, MS/Extremity: Negative for injury and        

      deformity, Skin: Negative for injury, rash, and discoloration, Neuro: Negative for          

      headache, weakness, numbness, tingling, and seizure, Psych: Negative for depression,        

      anxiety, suicide ideation, homicidal ideation, and hallucinations, Allergy/Immunology:      

      Negative for hives, rash, and allergies, Endocrine: Negative for neck swelling,             

      polydipsia, polyuria, polyphagia, and marked weight changes, Hematologic/Lymphatic:         

      Negative for swollen nodes, abnormal bleeding, and unusual bruising.                        

14:03 Abdomen/GI: Positive for abdominal pain, of the right upper quadrant.                       

                                                                                                  

Exam:                                                                                             

14:03 Constitutional:  This is a well developed, well nourished patient who is awake, alert,  angelina 

      and in no acute distress. Head/Face:  Normocephalic, atraumatic. Eyes:  Pupils equal        

      round and reactive to light, extra-ocular motions intact.  Lids and lashes normal.          

      Conjunctiva and sclera are non-icteric and not injected.  Cornea within normal limits.      

      Periorbital areas with no swelling, redness, or edema. ENT:  Nares patent. No nasal         

      discharge, no septal abnormalities noted.  Tympanic membranes are normal and external       

      auditory canals are clear.  Oropharynx with no redness, swelling, or masses, exudates,      

      or evidence of obstruction, uvula midline.  Mucous membranes moist. Neck:  Trachea          

      midline, no thyromegaly or masses palpated, and no cervical lymphadenopathy.  Supple,       

      full range of motion without nuchal rigidity, or vertebral point tenderness.  No            

      Meningismus. Chest/axilla:  Normal chest wall appearance and motion.  Nontender with no     

      deformity.  No lesions are appreciated. Cardiovascular:  Regular rate and rhythm with a     

      normal S1 and S2.  No gallops, murmurs, or rubs.  Normal PMI, no JVD.  No pulse             

      deficits. Respiratory:  Lungs have equal breath sounds bilaterally, clear to                

      auscultation and percussion.  No rales, rhonchi or wheezes noted.  No increased work of     

      breathing, no retractions or nasal flaring. Back:  No spinal tenderness.  No                

      costovertebral tenderness.  Full range of motion. Skin:  Warm, dry with normal turgor.      

      Normal color with no rashes, no lesions, and no evidence of cellulitis. MS/ Extremity:      

      Pulses equal, no cyanosis.  Neurovascular intact.  Full, normal range of motion. Neuro:     

       Awake and alert, GCS 15, oriented to person, place, time, and situation.  Cranial          

      nerves II-XII grossly intact.  Motor strength 5/5 in all extremities.  Sensory grossly      

      intact.  Cerebellar exam normal.  Normal gait. Psych:  Awake, alert, with orientation       

      to person, place and time.  Behavior, mood, and affect are within normal limits.            

14:03 Abdomen/GI: Inspection: abdomen appears normal, Bowel sounds: normal, Palpation: mild       

      abdominal tenderness, in the epigastric area and right upper quadrant, Rectal exam:         

      rectal tone Liver: no appreciated palpable abnormalities, Hernia: not appreciated.          

                                                                                                  

Vital Signs:                                                                                      

13:36  / 71; Pulse 79; Resp 18; Temp 98.2; Pulse Ox 96% on R/A; Weight 89.81 kg; Height aj  

      5 ft. 10 in. (177.80 cm);                                                                   

14:41 BP 94 / 69; Pulse 71; Resp 16; Temp 98.1(O); Pulse Ox 97% on R/A;                       mh5 

15:29  / 72; Pulse 70; Resp 16; Temp 97.9(O); Pulse Ox 97% on R/A;                      mh5 

13:36 Body Mass Index 28.41 (89.81 kg, 177.80 cm)                                               

                                                                                                  

MDM:                                                                                              

13:46 Patient medically screened.                                                             [facility] 

14:05 Data reviewed: vital signs, nurses notes, lab test result(s).                           [facility] 

                                                                                                  

                                                                                             

14:02 Order name: Basic Metabolic Panel; Complete Time: 15:36                                 [facility] 

                                                                                             

14:02 Order name: CBC with Diff; Complete Time: 15:13                                         [facility] 

                                                                                             

14:02 Order name: Creatinine for Radiology; Complete Time: 15:36                              [facility] 

                                                                                             

14:02 Order name: Hepatic Function; Complete Time: 15:36                                      [facility] 

                                                                                             

14:02 Order name: Lipase; Complete Time: 15:36                                                [facility] 

                                                                                             

14:02 Order name: Urine Culture                                                               [facility] 

                                                                                             

14:02 Order name: IV Saline Lock; Complete Time: 14:43                                        [facility] 

                                                                                             

14:02 Order name: Labs collected and sent; Complete Time: 14:43                               [facility] 

                                                                                             

14:37 Order name: Urine Dipstick--Ancillary (enter results); Complete Time: 15:13               

                                                                                             

14:37 Order name: Urine Pregnancy--Ancillary (enter results); Complete Time: 15:13              

                                                                                             

14:02 Order name: Urine Dipstick-Ancillary (obtain specimen); Complete Time: 14:43            [facility] 

                                                                                             

14:02 Order name: Urine Pregnancy Test (obtain specimen); Complete Time: 14:43                [facility] 

                                                                                                  

Administered Medications:                                                                         

14:30 Drug: NS 0.9% 1000 ml Route: IV; Rate: 1 bolus; Site: right hand;                       bp  

16:04 Follow up: IV Status: Completed infusion; IV Intake: 1000ml                             bp  

14:30 Drug: GI Cocktail without Donnatal - (Maalox Suspension 30 ml, Lidocaine Liquid 2 % 15  bp  

      ml) Route: PO;                                                                              

15:23 Follow up: Response: No adverse reaction                                                bp  

14:30 Drug: Pepcid 20 mg Route: IVP; Site: right hand;                                        bp  

15:22 Follow up: Response: No adverse reaction                                                bp  

15:30 Drug: Rocephin - (cefTRIAXone) 1 grams Route: IVPB; Infused Over: 30 mins; Site: right  bp  

      hand;                                                                                       

15:39 Follow up: IV Status: Completed infusion                                                bp  

                                                                                                  

                                                                                                  

Disposition:                                                                                      

19 15:37 Discharged to Home. Impression: Abdominal tenderness, Functional dyspepsia,        

  Urinary tract infection, site not specified.                                                    

- Condition is Stable.                                                                            

- Discharge Instructions: Abdominal Pain, Adult, Dysuria, Indigestion, Abdominal Pain,            

  Adult, Easy-to-Read.                                                                            

- Prescriptions for Bentyl 20 mg Oral Tablet - take 1 tablet by ORAL route every 6                

  hours As needed; 20 tablet. Pepcid 20 mg Oral Tablet - take 1 tablet by ORAL route              

  every 12 hours for 10 days; 20 tablet. Zofran 4 mg Oral Tablet - take 1 tablet by               

  ORAL route every 12 hours As needed; 20 tablet. Cipro 250 mg Oral Tablet - take 1               

  tablet by ORAL route every 12 hours; 14 tablet.                                                 

- Medication Reconciliation Form, Thank You Letter, Antibiotic Education, Prescription            

  Opioid Use, Work release form form.                                                             

- Follow up: Private Physician; When: 2 - 3 days; Reason: Recheck today's complaints,             

  Continuance of care, Re-evaluation by your physician. Follow up: Chris Ceja MD;           

  When: 2 - 3 days; Reason: Recheck today's complaints, Re-evaluation by your physician.          

- Problem is new.                                                                                 

- Symptoms have improved.                                                                         

                                                                                                  

                                                                                                  

                                                                                                  

Signatures:                                                                                       

Dispatcher [employer]                           Vanesa Polanco RN RN aj Anderson, Corey, MD MD cha Peltier, Brian, RN                      RN   bp                                                   

                                                                                                  

Corrections: (The following items were deleted from the chart)                                    

15:37 15:37 2019 15:37 Discharged to Home. Impression: Abdominal tenderness; Functional angelina 

      dyspepsia; Urinary tract infection, site not specified. Condition is Stable. Discharge      

      Instructions: Abdominal Pain, Adult, Indigestion, Abdominal Pain, Adult, Easy-to-Read,      

      Dysuria. Prescriptions for Bentyl 20 mg Oral Tablet - take 1 tablet by ORAL route every     

      6 hours As needed; 20 tablet, Pepcid 20 mg Oral Tablet - take 1 tablet by ORAL route        

      every 12 hours for 10 days; 20 tablet, Zofran 4 mg Oral Tablet - take 1 tablet by ORAL      

      route every 12 hours As needed; 20 tablet, Cipro 250 mg Oral Tablet - take 1 tablet by      

      ORAL route every 12 hours; 14 tablet. and Forms are Medication Reconciliation Form,         

      Thank You Letter, Antibiotic Education, Prescription Opioid Use. Follow up: Private         

      Physician; When: 2 - 3 days; Reason: Recheck today's complaints, Continuance of care,       

      Re-evaluation by your physician. Problem is new. Symptoms have improved. [facility]                

16:05 15:37 2019 15:37 Discharged to Home. Impression: Abdominal tenderness; Functional bp  

      dyspepsia; Urinary tract infection, site not specified. Condition is Stable. Discharge      

      Instructions: Abdominal Pain, Adult, Indigestion, Abdominal Pain, Adult, Easy-to-Read,      

      Dysuria. Prescriptions for Bentyl 20 mg Oral Tablet - take 1 tablet by ORAL route every     

      6 hours As needed; 20 tablet, Pepcid 20 mg Oral Tablet - take 1 tablet by ORAL route        

      every 12 hours for 10 days; 20 tablet, Zofran 4 mg Oral Tablet - take 1 tablet by ORAL      

      route every 12 hours As needed; 20 tablet, Cipro 250 mg Oral Tablet - take 1 tablet by      

      ORAL route every 12 hours; 14 tablet. and Forms are Medication Reconciliation Form,         

      Thank You Letter, Antibiotic Education, Prescription Opioid Use. Follow up: Private         

      Physician; When: 2 - 3 days; Reason: Recheck today's complaints, Continuance of care,       

      Re-evaluation by your physician. Follow up: Chris Ceja; When: 2 - 3 days; Reason:       

      Recheck today's complaints, Re-evaluation by your physician. Problem is new. Symptoms       

      have improved. [facility]                                                                          

                                                                                                  

**************************************************************************************************

## 2019-11-05 ENCOUNTER — HOSPITAL ENCOUNTER (EMERGENCY)
Dept: HOSPITAL 97 - ER | Age: 29
Discharge: HOME | End: 2019-11-05
Payer: SELF-PAY

## 2019-11-05 VITALS — SYSTOLIC BLOOD PRESSURE: 124 MMHG | DIASTOLIC BLOOD PRESSURE: 86 MMHG | OXYGEN SATURATION: 99 % | TEMPERATURE: 97.5 F

## 2019-11-05 DIAGNOSIS — F17.210: ICD-10-CM

## 2019-11-05 DIAGNOSIS — B00.9: Primary | ICD-10-CM

## 2019-11-05 DIAGNOSIS — Z88.2: ICD-10-CM

## 2019-11-05 DIAGNOSIS — F31.9: ICD-10-CM

## 2019-11-05 PROCEDURE — 81025 URINE PREGNANCY TEST: CPT

## 2019-11-05 PROCEDURE — 82947 ASSAY GLUCOSE BLOOD QUANT: CPT

## 2019-11-05 PROCEDURE — 81003 URINALYSIS AUTO W/O SCOPE: CPT

## 2019-11-05 PROCEDURE — 99282 EMERGENCY DEPT VISIT SF MDM: CPT

## 2019-11-05 NOTE — ER
Nurse's Notes                                                                                     

 [facility]                                                                 

Name: Rigoberto Faye                                                                               

Age: 29 yrs                                                                                       

Sex: Female                                                                                       

: 1990                                                                                   

MRN: [id number]                                                                                   

Arrival Date: 2019                                                                          

Time: 13:25                                                                                       

Account#: [id number]                                                                            

Bed 11                                                                                            

Private MD:                                                                                       

Diagnosis: Herpesviral [herpes simplex] infections;Dizziness and giddiness                        

                                                                                                  

Presentation:                                                                                     

                                                                                             

13:28 Presenting complaint: Cold sore x 2 days. Started self medicating with Zithromax        hb  

      yesterday. Today reports fatigue, dizziness, and SOB that resolved PTA. Transition of       

      care: patient was not received from another setting of care. Onset of symptoms was          

      2019. Risk Assessment: Do you want to hurt yourself or someone else?           

      Patient reports no desire to harm self or others. Care prior to arrival: None.              

13:28 Method Of Arrival: Ambulatory                                                             

13:28 Acuity: ANGELES 4                                                                           hb  

13:30 Initial Sepsis Screen: Does the patient meet any 2 criteria? No. Patient's initial      hb  

      sepsis screen is negative. Does the patient have a suspected source of infection? No.       

      Patient's initial sepsis screen is negative.                                                

                                                                                                  

Triage Assessment:                                                                                

13:30 General: Appears in no apparent distress. Behavior is calm, cooperative. Pain:          hb  

      Complains of pain in lower lip Pain currently is 6 out of 10 on a pain scale. Neuro:        

      Level of Consciousness is awake, alert, obeys commands, Oriented to person, place,          

      time, situation. Cardiovascular: Capillary refill < 3 seconds. Respiratory: Reports         

      Airway is patent Respiratory effort is even, unlabored, Respiratory pattern is regular,     

      symmetrical.                                                                                

                                                                                                  

Historical:                                                                                       

- Allergies:                                                                                      

13:32 Sulfa (Sulfonamide Antibiotics);                                                        hb  

- Home Meds:                                                                                      

13:32 oxcarbazepine Oral [Active];                                                            hb  

- PMHx:                                                                                           

13:32 Bipolar disorder;                                                                       hb  

- PSHx:                                                                                           

13:32 None;                                                                                   hb  

                                                                                                  

- Immunization history:: Adult Immunizations up to date.                                          

- Social history:: Smoking status: Patient uses tobacco products, smokes one pack                 

  cigarettes per day.                                                                             

- Ebola Screening: : No symptoms or risks identified at this time.                                

                                                                                                  

                                                                                                  

Screenin:30 Abuse screen: Denies threats or abuse. Denies injuries from another. Nutritional        hb  

      screening: No deficits noted. Tuberculosis screening: No symptoms or risk factors           

      identified. Fall Risk None identified.                                                      

                                                                                                  

Assessment:                                                                                       

13:30 General: see triage assessment.                                                         hb  

                                                                                                  

Vital Signs:                                                                                      

13:32  / 86; Pulse 81; Resp 16; Temp 97.5; Pulse Ox 99% on R/A; Weight 72.57 kg; Height hb  

      5 ft. 10 in. (177.80 cm); Pain 6/10;                                                        

13:32 Body Mass Index 22.96 (72.57 kg, 177.80 cm)                                             hb  

                                                                                                  

ED Course:                                                                                        

13:25 Patient arrived in ED.                                                                  mr  

13:32 Triage completed.                                                                       hb  

13:32 Arm band placed on.                                                                     hb  

13:34 Marilee Antonio FNP-C is PHCP.                                                        kb  

13:34 Jonathan Ch MD is Attending Physician.                                              kb  

13:40 Patient has correct armband on for positive identification. Call light in reach.        hb  

14:13 Judy Prado, RN is Primary Nurse.                                                   hb  

14:55 No provider procedures requiring assistance completed. Patient did not have IV access   ss  

      during this emergency room visit.                                                           

                                                                                                  

Administered Medications:                                                                         

No medications were administered                                                                  

                                                                                                  

                                                                                                  

Point of Care Testing:                                                                            

      Blood Glucose:                                                                              

14:10 Blood Glucose: 98 mg/dL;                                                                hb  

      Ranges:                                                                                     

                                                                                                  

Outcome:                                                                                          

14:28 Discharge ordered by MD.                                                                kb  

14:55 Discharged to home ambulatory.                                                          ss  

14:55 Condition: good                                                                             

14:55 Discharge instructions given to patient, Instructed on discharge instructions, follow       

      up and referral plans. Demonstrated understanding of instructions, follow-up care.          

14:57 Patient left the ED.                                                                    ss  

                                                                                                  

Signatures:                                                                                       

Marilee Antonio FNP-C FNP-Ckb                                                   

Melia QureshiCeleste RN                      RN   ss                                                   

Judy Prado, RN                     RN   hb                                                   

                                                                                                  

**************************************************************************************************

## 2019-11-05 NOTE — EDPHYS
Physician Documentation                                                                           

 [facility] – [place], [place]                                                                 

Name: Rigoberto Faye                                                                               

Age: 29 yrs                                                                                       

Sex: Female                                                                                       

: 1990                                                                                   

MRN: [id number]                                                                                   

Arrival Date: 2019                                                                          

Time: 13:25                                                                                       

Account#: [id number]                                                                            

Bed 11                                                                                            

Private MD:                                                                                       

ED Physician Jonathan Ch                                                                       

HPI:                                                                                              

                                                                                             

14:22 This 29 yrs old  Female presents to ER via Ambulatory with complaints of       kb  

      Dizziness, Shortness Of Breath, Urinary Problem, Lip sore, fatigue.                         

14:22 The patient presents with lightheadedness. Onset: The symptoms/episode began/occurred   kb  

      just prior to arrival. Context: occurred at work, occurred while the patient was            

      working. Modifying factors: The symptoms are alleviated by eating, the symptoms are         

      aggravated by nothing. Associated signs and symptoms: The patient has no apparent           

      associated signs or symptoms. Severity of symptoms: At their worst the symptoms were        

      moderate in the emergency department the symptoms have resolved. Patient's baseline:        

      Neuro: alert and fully oriented, Motor: no deficits, Ambulation: walks without              

      assistance, Speech: normal. The patient has not experienced similar symptoms in the         

      past. Pt reports she has had 2 cold sores over the last month so she looked on web md       

      and it said she may need an antibiotic to treat an underlying infection causing the         

      cold sores. Reports she had a z-pack that her mother gave her so she started that this      

      morning. Took first dose, went to work, ate 3 breakfast tacos and had some coffee.          

      States she started feeling lightheaded, dizzy and was breathing fast at about 1030. She     

      was taken to the office by safety and given a honey bun to eat. States she started          

      feeling a little better, but still groggy so she was brought here. Ate a breakfast taco     

      in the lobby and is feeling better. Pt reports frequent urination and family history of     

      diabetes. .                                                                                 

14:26 The patient has not recently seen a physician.                                          kb  

                                                                                                  

Historical:                                                                                       

- Allergies:                                                                                      

13:32 Sulfa (Sulfonamide Antibiotics);                                                        hb  

- Home Meds:                                                                                      

13:32 oxcarbazepine Oral [Active];                                                            hb  

- PMHx:                                                                                           

13:32 Bipolar disorder;                                                                       hb  

- PSHx:                                                                                           

13:32 None;                                                                                   hb  

                                                                                                  

- Immunization history:: Adult Immunizations up to date.                                          

- Social history:: Smoking status: Patient uses tobacco products, smokes one pack                 

  cigarettes per day.                                                                             

- Ebola Screening: : No symptoms or risks identified at this time.                                

                                                                                                  

                                                                                                  

ROS:                                                                                              

14:22 Constitutional: Negative for fever, chills, and weight loss, ENT: Negative for injury,  kb  

      pain, and discharge, Neck: Negative for injury, pain, and swelling, Cardiovascular:         

      Negative for chest pain, palpitations, and edema, Respiratory: Negative for shortness       

      of breath, cough, wheezing, and pleuritic chest pain, Abdomen/GI: Negative for              

      abdominal pain, nausea, vomiting, diarrhea, and constipation, Back: Negative for injury     

      and pain, MS/Extremity: Negative for injury and deformity, Skin: Negative for injury,       

      rash, and discoloration.  Reports cold sore Neuro: Negative for headache, weakness,         

      numbness, tingling, and seizure.                                                            

                                                                                                  

Exam:                                                                                             

14:22 Constitutional:  This is a well developed, well nourished patient who is awake, alert,  kb  

      and in no acute distress. Head/Face:  Normocephalic, atraumatic. ENT:  Nares patent. No     

      nasal discharge, no septal abnormalities noted.  Tympanic membranes are normal and          

      external auditory canals are clear.  Oropharynx with no redness, swelling, or masses,       

      exudates, or evidence of obstruction, uvula midline.  Mucous membranes moist. Neck:         

      Trachea midline, no thyromegaly or masses palpated, and no cervical lymphadenopathy.        

      Supple, full range of motion without nuchal rigidity, or vertebral point tenderness.        

      No Meningismus. Chest/axilla:  Normal chest wall appearance and motion.  Nontender with     

      no deformity.  No lesions are appreciated. Cardiovascular:  Regular rate and rhythm         

      with a normal S1 and S2.  No gallops, murmurs, or rubs.  Normal PMI, no JVD.  No pulse      

      deficits. Respiratory:  Lungs have equal breath sounds bilaterally, clear to                

      auscultation and percussion.  No rales, rhonchi or wheezes noted.  No increased work of     

      breathing, no retractions or nasal flaring. Abdomen/GI:  Soft, non-tender, with normal      

      bowel sounds.  No distension or tympany.  No guarding or rebound.  No evidence of           

      tenderness throughout. Back:  No spinal tenderness.  No costovertebral tenderness.          

      Full range of motion. MS/ Extremity:  Pulses equal, no cyanosis.  Neurovascular intact.     

       Full, normal range of motion. Neuro:  Awake and alert, GCS 15, oriented to person,         

      place, time, and situation.  Cranial nerves II-XII grossly intact.  Motor strength 5/5      

      in all extremities.  Sensory grossly intact.  Cerebellar exam normal.  Normal gait.         

14:22 Skin: cold sore, bottom left lip.                                                           

                                                                                                  

Vital Signs:                                                                                      

13:32  / 86; Pulse 81; Resp 16; Temp 97.5; Pulse Ox 99% on R/A; Weight 72.57 kg; Height hb  

      5 ft. 10 in. (177.80 cm); Pain 6/10;                                                        

13:32 Body Mass Index 22.96 (72.57 kg, 177.80 cm)                                             hb  

                                                                                                  

MDM:                                                                                              

13:38 Patient medically screened.                                                             kb  

14:19 Data reviewed: vital signs, nurses notes. Data interpreted: Pulse oximetry: on room air kb  

      is 99 %. Interpretation: normal. Counseling: I had a detailed discussion with the           

      patient and/or guardian regarding: the historical points, exam findings, and any            

      diagnostic results supporting the discharge/admit diagnosis, lab results, the need for      

      outpatient follow up, a family practitioner, to return to the emergency department if       

      symptoms worsen or persist or if there are any questions or concerns that arise at          

      home. ED course: Pt educated on possible hypoglycemic episode causing                       

      lightheadedness/dizziness based on given history and BGL of 98 now. Pt educated on          

      smaller, more frequent meals. Educated not to take antibiotics unless prescribed to         

      her. Pt has no signs of bacterial infection that warrants continuation of z-pack. .         

14:27 ED course: PT educated to follow up with PCP for Hemoglobin A1C check. Also discussed   kb  

      cold sores with pt. Educated that antibiotics will not cure them. Pt reports she            

      started this job in October and has had 2 outbreaks since then. Educated that the           

      stress of a new job could be the trigger. .                                                 

                                                                                                  

                                                                                             

14:18 Order name: Urine Dipstick--Ancillary (enter results); Complete Time: 14:45             bd  

                                                                                             

14:18 Order name: Urine Pregnancy--Ancillary (enter results); Complete Time: 14:45            bd  

                                                                                             

13:56 Order name: Urine Dipstick-Ancillary (obtain specimen); Complete Time: 14:13            kb  

                                                                                             

13:56 Order name: Blood Glucose Level; Complete Time: 14:13                                   kb  

                                                                                             

14:21 Order name: Glucose, Ancillary Testing; Complete Time: 14:29                            EDMS

                                                                                                  

Administered Medications:                                                                         

No medications were administered                                                                  

                                                                                                  

                                                                                                  

Point of Care Testing:                                                                            

      Blood Glucose:                                                                              

14:10 Blood Glucose: 98 mg/dL;                                                                hb  

      Ranges:                                                                                     

      Critical Glucose Levels:Adult <50 mg/dl or >400 mg/dl  <40 mg/dl or >180 mg/dl       

Disposition:                                                                                      

                                                                                             

07:19 Co-signature as Attending Physician, Jonathan Ch MD I agree with the assessment and   kdr 

      plan of care.                                                                               

                                                                                                  

Disposition:                                                                                      

19 14:28 Discharged to Home. Impression: Herpesviral [herpes simplex] infections,           

  Dizziness and giddiness.                                                                        

- Condition is Stable.                                                                            

- Discharge Instructions: Hypoglycemia, Easy-to-Read, Dizziness, Easy-to-Read, Cold               

  Sore, Easy-to-Read.                                                                             

                                                                                                  

- Medication Reconciliation Form, Thank You Letter, Antibiotic Education, Prescription            

  Opioid Use, Work release form form.                                                             

- Follow up: Emergency Department; When: As needed; Reason: Worsening of condition.               

  Follow up: Private Physician; When: 2 - 3 days; Reason: Recheck today's complaints,             

  Continuance of care, Re-evaluation by your physician.                                           

                                                                                                  

                                                                                                  

                                                                                                  

Signatures:                                                                                       

Dispatcher [employer]                           EDMarilee Waterman, GREGORIA LEMOS-Jonathan Estrada MD MD   [facility]                                                  

Celeste Archibald RN RN   ss                                                   

Judy Prado RN                     RN                                                      

                                                                                                  

Corrections: (The following items were deleted from the chart)                                    

                                                                                             

14:22 14:22 Constitutional: Negative for fever, chills, and weight loss, ENT: Negative for    kb  

      injury, pain, and discharge, Neck: Negative for injury, pain, and swelling,                 

      Cardiovascular: Negative for chest pain, palpitations, and edema, Respiratory: Negative     

      for shortness of breath, cough, wheezing, and pleuritic chest pain, Abdomen/GI:             

      Negative for abdominal pain, nausea, vomiting, diarrhea, and constipation, Back:            

      Negative for injury and pain, MS/Extremity: Negative for injury and deformity, Skin:        

      Negative for injury, rash, and discoloration, Neuro: Negative for headache, weakness,       

      numbness, tingling, and seizure, kb                                                         

14:26 14:22 Pt reports she has had 2 cold sores over the last month so she looked on web md [place]  

      and it said she may need an antibiotic to treat an underlying infection causing the         

      cold sores. Reports she had a z-pack that her mother gave her so she started that this      

      morning. Took first dose, went to work, ate 3 breakfast tacos and had some coffee.          

      States she started feeling lightheaded, dizzy and was breathing fast at about 1030. She     

      was taken to the office by safety and given a honey bun to eat. States she started          

      feeling a little better, but still groggy so she was brought here. Ate a breakfast taco     

      in the lobby and is feeling better. . kb                                                    

14:57 14:28 2019 14:28 Discharged to Home. Impression: Herpesviral [herpes simplex]     ss  

      infections; Dizziness and giddiness. Condition is Stable. Forms are Medication              

      Reconciliation Form, Thank You Letter, Antibiotic Education, Prescription Opioid Use.       

      Follow up: Emergency Department; When: As needed; Reason: Worsening of condition.           

      Follow up: Private Physician; When: 2 - 3 days; Reason: Recheck today's complaints,         

      Continuance of care, Re-evaluation by your physician. kb                                    

                                                                                                  

**************************************************************************************************

## 2019-11-15 ENCOUNTER — HOSPITAL ENCOUNTER (EMERGENCY)
Dept: HOSPITAL 97 - ER | Age: 29
Discharge: HOME | End: 2019-11-15
Payer: SELF-PAY

## 2019-11-15 VITALS — TEMPERATURE: 98.6 F | DIASTOLIC BLOOD PRESSURE: 75 MMHG | OXYGEN SATURATION: 98 % | SYSTOLIC BLOOD PRESSURE: 119 MMHG

## 2019-11-15 DIAGNOSIS — E16.2: Primary | ICD-10-CM

## 2019-11-15 DIAGNOSIS — F17.210: ICD-10-CM

## 2019-11-15 DIAGNOSIS — Z88.2: ICD-10-CM

## 2019-11-15 PROCEDURE — 82947 ASSAY GLUCOSE BLOOD QUANT: CPT

## 2019-11-15 PROCEDURE — 99283 EMERGENCY DEPT VISIT LOW MDM: CPT

## 2019-11-15 NOTE — EDPHYS
Physician Documentation                                                                           

 [facility]                                                                 

Name: Rigoberto Faye                                                                               

Age: 29 yrs                                                                                       

Sex: Female                                                                                       

: 1990                                                                                   

MRN: [id number]                                                                                   

Arrival Date: 11/15/2019                                                                          

Time: 21:32                                                                                       

Account#: [id number]                                                                            

Bed 25                                                                                            

Private MD:                                                                                       

ED Physician Jas Santiago                                                                     

HPI:                                                                                              

                                                                                             

06:46 This 29 yrs old  Female presents to ER via Ambulatory with complaints of       tw4 

      Weakness, Headache, Low Blood Sugar.                                                        

06:46 The patient or guardian reports hypoglycemia. Onset: The symptoms/episode               tw4 

      began/occurred today. Associated signs and symptoms: Pertinent positives: nausea,           

      headache , generalized weakness. The patient has not experienced similar symptoms in        

      the past.                                                                                   

                                                                                                  

OB/GYN:                                                                                           

11/15                                                                                             

21:46 LMP 2019                                                                          rv  

                                                                                                  

Historical:                                                                                       

- Allergies:                                                                                      

21:47 Sulfa (Sulfonamide Antibiotics);                                                        rv  

- Home Meds:                                                                                      

21:47 oxcarbazepine Oral [Active];                                                            rv  

- PMHx:                                                                                           

21:47 Bipolar disorder;                                                                       rv  

- PSHx:                                                                                           

21:47 None;                                                                                   rv  

                                                                                                  

- Immunization history:: Adult Immunizations up to date.                                          

- Social history:: Smoking status: Patient uses tobacco products, smokes one pack                 

  cigarettes per day.                                                                             

- Ebola Screening: : No symptoms or risks identified at this time.                                

                                                                                                  

                                                                                                  

ROS:                                                                                              

                                                                                             

06:46 Constitutional: Negative for fever, chills, and weight loss, Eyes: Negative for injury, tw4 

      pain, redness, and discharge, Cardiovascular: Negative for chest pain, palpitations,        

      and edema, Respiratory: Negative for shortness of breath, cough, wheezing, and              

      pleuritic chest pain.                                                                       

      Back: Negative for injury and pain, MS/Extremity: Negative for injury and deformity,        

      Skin: Negative for injury, rash, and discoloration.                                         

      Abdomen/GI: Positive for nausea, Negative for abdominal pain, nausea and vomiting,          

      nausea, vomiting, and diarrhea.                                                             

      Neuro: Positive for weakness.                                                               

                                                                                                  

Exam:                                                                                             

06:46 Constitutional:  This is a well developed, well nourished patient who is awake, alert,  tw4 

      and in no acute distress. Head/Face:  Normocephalic, atraumatic. Chest/axilla:  Normal      

      chest wall appearance and motion.  Nontender with no deformity.  No lesions are             

      appreciated. Cardiovascular:  Regular rate and rhythm with a normal S1 and S2.  No          

      gallops, murmurs, or rubs.  Normal PMI, no JVD.  No pulse deficits. Respiratory:  Lungs     

      have equal breath sounds bilaterally, clear to auscultation and percussion.  No rales,      

      rhonchi or wheezes noted.  No increased work of breathing, no retractions or nasal          

      flaring. Abdomen/GI:  Soft, non-tender, with normal bowel sounds.  No distension or         

      tympany.  No guarding or rebound.  No evidence of tenderness throughout. Back:  No          

      spinal tenderness.  No costovertebral tenderness.  Full range of motion. MS/ Extremity:     

       Pulses equal, no cyanosis.  Neurovascular intact.  Full, normal range of motion.           

      Neuro:  Awake and alert, GCS 15, oriented to person, place, time, and situation.            

      Cranial nerves II-XII grossly intact.  Motor strength 5/5 in all extremities.  Sensory      

      grossly intact.  Cerebellar exam normal.  Normal gait.                                      

                                                                                                  

Vital Signs:                                                                                      

11/15                                                                                             

21:49  / 75; Pulse 75; Resp 16; Temp 98.6; Pulse Ox 98% ; Weight 86.18 kg; Height 5 ft. rv  

      10 in. (177.80 cm); Pain 0/10;                                                              

21:49 Body Mass Index 27.26 (86.18 kg, 177.80 cm)                                             rv  

                                                                                                  

MDM:                                                                                              

21:47 Patient medically screened.                                                             tw4 

                                                                                             

06:46 Data reviewed: vital signs, nurses notes. Counseling: I had a detailed discussion with  [facility] 

      the patient and/or guardian regarding: the historical points, exam findings, and any        

      diagnostic results supporting the discharge/admit diagnosis, lab results.                   

                                                                                                  

11/15                                                                                             

21:52 Order name: Glucose, Ancillary Testing                                                  EDMS

                                                                                                  

Administered Medications:                                                                         

No medications were administered                                                                  

                                                                                                  

                                                                                                  

Disposition:                                                                                      

11/15/19 22:12 Discharged to Home. Impression: Hypoglycemia, unspecified.                         

- Condition is Stable.                                                                            

- Discharge Instructions: Hypoglycemia.                                                           

                                                                                                  

- Medication Reconciliation Form, Thank You Letter, Antibiotic Education, Prescription            

  Opioid Use, Work release form form.                                                             

- Follow up: Private Physician; When: Upon discharge from the Emergency Department;               

  Reason: Recheck today's complaints, Continuance of care.                                        

- Problem is new.                                                                                 

- Symptoms have improved.                                                                         

                                                                                                  

                                                                                                  

                                                                                                  

Signatures:                                                                                       

Dispatcher [employer]                           EDMS                                                 

Jas Santiago MD MD   tw4                                                  

Allen Bey RN                    RN   rv                                                   

                                                                                                  

Corrections: (The following items were deleted from the chart)                                    

11/15                                                                                             

22:25 22:12 11/15/2019 22:12 Discharged to Home. Impression: Hypoglycemia, unspecified.       rv  

      Condition is Stable. Forms are Medication Reconciliation Form, Thank You Letter,            

      Antibiotic Education, Prescription Opioid Use. Follow up: Private Physician; When: Upon     

      discharge from the Emergency Department; Reason: Recheck today's complaints,                

      Continuance of care. Problem is new. Symptoms have improved. tw4                            

                                                                                                  

**************************************************************************************************

## 2019-11-15 NOTE — ER
Nurse's Notes                                                                                     

 [facility]                                                                 

Name: Rigoberto Faye                                                                               

Age: 29 yrs                                                                                       

Sex: Female                                                                                       

: 1990                                                                                   

MRN: [id number]                                                                                   

Arrival Date: 11/15/2019                                                                          

Time: 21:32                                                                                       

Account#: [id number]                                                                            

Bed 25                                                                                            

Private MD:                                                                                       

Diagnosis: Hypoglycemia, unspecified                                                              

                                                                                                  

Presentation:                                                                                     

11/15                                                                                             

21:44 Presenting complaint: Patient states: I was here last week because I had an episode of  rv  

      hypoglycemia. I was discharged and instructed me to follow up with the clinic at            

      [place] but I don't have insurance. today at work, it happened again. Transition of        

      care: patient was not received from another setting of care.                                

21:44 Method Of Arrival: Ambulatory                                                           rv  

21:48 Onset of symptoms is unknown. Risk Assessment: Do you want to hurt yourself or someone  rv  

      else? Patient reports no desire to harm self or others. Initial Sepsis Screen: Does the     

      patient meet any 2 criteria? No. Patient's initial sepsis screen is negative. Does the      

      patient have a suspected source of infection? No. Patient's initial sepsis screen is        

      negative. Care prior to arrival: None.                                                      

21:48 Acuity: ANGELES 3                                                                           rv  

                                                                                                  

OB/GYN:                                                                                           

21:46 LMP 2019                                                                          rv  

                                                                                                  

Historical:                                                                                       

- Allergies:                                                                                      

21:47 Sulfa (Sulfonamide Antibiotics);                                                        rv  

- Home Meds:                                                                                      

21:47 oxcarbazepine Oral [Active];                                                            rv  

- PMHx:                                                                                           

21:47 Bipolar disorder;                                                                       rv  

- PSHx:                                                                                           

21:47 None;                                                                                   rv  

                                                                                                  

- Immunization history:: Adult Immunizations up to date.                                          

- Social history:: Smoking status: Patient uses tobacco products, smokes one pack                 

  cigarettes per day.                                                                             

- Ebola Screening: : No symptoms or risks identified at this time.                                

                                                                                                  

                                                                                                  

Screenin:48 Abuse screen: Denies threats or abuse. Denies injuries from another. Nutritional        rv  

      screening: No deficits noted. Tuberculosis screening: No symptoms or risk factors           

      identified.                                                                                 

21:49 Fall Risk None identified.                                                              rv  

                                                                                                  

Assessment:                                                                                       

21:47 General: Appears in no apparent distress. comfortable, Behavior is calm, cooperative.   rv  

      Pain: Denies pain. Neuro: Level of Consciousness is awake, alert, obeys commands,           

      Oriented to person, place, time, situation. Cardiovascular: Patient's skin is warm and      

      dry. Respiratory: Airway is patent. GI: No signs and/or symptoms were reported              

      involving the gastrointestinal system. : No signs and/or symptoms were reported           

      regarding the genitourinary system. EENT: No signs and/or symptoms were reported            

      regarding the EENT system. Derm: Skin is intact. Musculoskeletal: No signs and/or           

      symptoms reported regarding the musculoskeletal system.                                     

22:00 Reassessment: patient refused the blood works. referred to Dr Santiago, plan to repeat    rv  

      FSBS after 30 minutes. patient agreed.                                                      

                                                                                                  

Vital Signs:                                                                                      

21:49  / 75; Pulse 75; Resp 16; Temp 98.6; Pulse Ox 98% ; Weight 86.18 kg; Height 5 ft. rv  

      10 in. (177.80 cm); Pain 0/10;                                                              

21:49 Body Mass Index 27.26 (86.18 kg, 177.80 cm)                                             rv  

                                                                                                  

ED Course:                                                                                        

21:32 Patient arrived in ED.                                                                  ds1 

21:43 Allen Bey, RN is Primary Nurse.                                                  rv  

21:47 Jas Santiago MD is Attending Physician.                                            tw4 

21:48 Triage completed.                                                                       rv  

21:48 Patient has correct armband on for positive identification. Bed in low position. Call   rv  

      light in reach. Side rails up X 1. Pulse ox on. NIBP on.                                    

21:50 Patient placed in the treatment room, on a stretcher, on pulse oximetry, Patient        rv  

      notified of wait time.                                                                      

22:24 No provider procedures requiring assistance completed. Patient did not have IV access   rv  

      during this emergency room visit.                                                           

                                                                                                  

Administered Medications:                                                                         

No medications were administered                                                                  

                                                                                                  

                                                                                                  

Outcome:                                                                                          

22:12 Discharge ordered by MD.                                                                tw4 

22:25 Discharged to home ambulatory.                                                          rv  

22:25 Condition: good                                                                             

22:25 Discharge instructions given to patient, Instructed on discharge instructions, follow       

      up and referral plans. Demonstrated understanding of instructions, follow-up care.          

22:25 Patient left the ED.                                                                    rv  

                                                                                                  

Signatures:                                                                                       

Beatriz Saavedra                                ds1                                                  

Jas Santiago MD MD   tw4                                                  

Allen Bey, [place]                    RN   rv                                                   

                                                                                                  

**************************************************************************************************

## 2020-12-15 ENCOUNTER — HOSPITAL ENCOUNTER (EMERGENCY)
Dept: HOSPITAL 97 - ER | Age: 30
Discharge: HOME | End: 2020-12-15
Payer: SELF-PAY

## 2020-12-15 DIAGNOSIS — Z88.2: ICD-10-CM

## 2020-12-15 DIAGNOSIS — F17.210: ICD-10-CM

## 2020-12-15 DIAGNOSIS — K29.70: Primary | ICD-10-CM

## 2020-12-15 PROCEDURE — 99283 EMERGENCY DEPT VISIT LOW MDM: CPT

## 2020-12-15 NOTE — ER
Nurse's Notes                                                                                     

 [facility]                                                                 

Name: Rigoberto Faye                                                                               

Age: 30 yrs                                                                                       

Sex: Female                                                                                       

: 1990                                                                                   

MRN: [id number]                                                                                   

Arrival Date: 12/15/2020                                                                          

Time: 00:43                                                                                       

Account#: [id number]                                                                            

Bed 6                                                                                             

Private MD:                                                                                       

Diagnosis: Gastritis, unspecified, without bleeding                                               

                                                                                                  

Presentation:                                                                                     

12/15                                                                                             

00:53 Chief complaint: Patient states: Epigastric pain that started 4 days ago. Coronavirus     

      screen: Client denies travel out of the [place] in the last 14 days. At this time, the         

      client does not indicate any symptoms associated with coronavirus-19. Ebola Screen:         

      Patient negative for fever greater than or equal to 101.5 degrees Fahrenheit, and           

      additional compatible Ebola Virus Disease symptoms Patient denies exposure to               

      infectious person. Initial Sepsis Screen: Does the patient meet any 2 criteria? HR > 90     

      bpm. Does the patient have a suspected source of infection? Yes: Acute abdominal pain.      

      Risk Assessment: Do you want to hurt yourself or someone else? Patient reports no           

      desire to harm self or others. Onset of symptoms was December 15, 2020.                     

00:53 Method Of Arrival: Ambulatory                                                             

00:53 Acuity: ANGELES 4                                                                             

                                                                                                  

OB/GYN:                                                                                           

00:56 LMP 2020                                                                               

                                                                                                  

Historical:                                                                                       

- Allergies:                                                                                      

00:55 Sulfa (Sulfonamide Antibiotics);                                                          

- PMHx:                                                                                           

00:55 Bipolar disorder;                                                                         

- PSHx:                                                                                           

00:55 Tonsillectomy;                                                                            

                                                                                                  

- Immunization history:: Adult Immunizations up to date.                                          

- Social history:: Smoking status: Patient reports the use of cigarette tobacco                   

  products, smokes one pack cigarettes per day. Patient/guardian denies using alcohol,            

  street drugs.                                                                                   

- Family history:: not pertinent.                                                                 

- Hospitalizations: : No recent hospitalization is reported.                                      

                                                                                                  

                                                                                                  

Screenin:56 Abuse screen: Denies threats or abuse. Denies injuries from another. Nutritional          

      screening: No deficits noted. Tuberculosis screening: No symptoms or risk factors           

      identified. Fall Risk None identified.                                                      

                                                                                                  

Assessment:                                                                                       

00:55 General: Appears in no apparent distress. Behavior is calm, cooperative, appropriate      

      for age. Pain: Complains of pain in epigastric area. Neuro: Level of Consciousness is       

      awake, alert, obeys commands, Oriented to person, place, time, situation, Appropriate       

      for age. Cardiovascular: Capillary refill < 3 seconds. Respiratory: Airway is patent        

      Respiratory effort is even, unlabored, Respiratory pattern is regular, symmetrical. GI:     

      Abdomen is flat, non-distended, Reports upper abdominal pain, indigestion. : No signs     

      and/or symptoms were reported regarding the genitourinary system. EENT: No signs and/or     

      symptoms were reported regarding the EENT system. Derm: Skin is intact, is healthy with     

      good turgor, Skin is pink, warm \T\ dry. normal. Musculoskeletal: Circulation, motion,      

      and sensation intact.                                                                       

01:17 Reassessment: Patient is alert, oriented x 3, equal unlabored respirations, skin        wh  

      warm/dry/pink. Patient states feeling better. Patient states symptoms have improved.        

                                                                                                  

Vital Signs:                                                                                      

00:53  / 67; Pulse 111; Resp 18; Temp 98.2; Pulse Ox 100% on R/A; Weight 65.77 kg (M);  wh  

      Height 5 ft. 9 in. (175.26 cm);                                                             

00:53 Body Mass Index 21.41 (65.77 kg, 175.26 cm)                                               

                                                                                                  

ED Course:                                                                                        

00:43 Patient arrived in ED.                                                                  cl3 

00:44 Griffin Adler MD is Attending Physician.                                                rn  

00:53 Sudha Reddy is Primary Nurse.                                                           

00:54 Triage completed.                                                                         

00:56 Patient has correct armband on for positive identification. Bed in low position. Call     

      light in reach. Side rails up X 1. Pulse ox on. NIBP on.                                    

00:57 Arm band placed on right wrist.                                                           

01:17 No provider procedures requiring assistance completed. Patient did not have IV access     

      during this emergency room visit.                                                           

                                                                                                  

Administered Medications:                                                                         

01:02 Drug: GI Cocktail without Donnatal - (Maalox Suspension 30 ml, Lidocaine Liquid 2 % 15  wh  

      ml) Route: PO;                                                                              

01:18 Follow up: Response: No adverse reaction; Marked relief of symptoms                       

01:02 Drug: Pepcid 20 mg Route: PO;                                                             

01:18 Follow up: Response: No adverse reaction                                                  

                                                                                                  

                                                                                                  

Outcome:                                                                                          

01:04 Discharge ordered by MD.                                                                rn  

01:17 Discharged to home ambulatory.                                                            

01:17 Condition: stable                                                                           

01:17 Discharge instructions given to patient, Instructed on discharge instructions, follow       

      up and referral plans. POC Demonstrated understanding of instructions, follow-up care,      

      POC                                                                                         

01:18 Patient left the ED.                                                                      

                                                                                                  

Signatures:                                                                                       

Adler, Griffin, Sudha Zapien MD, rn, [place]                                cl3                                                  

                                                                                                  

**************************************************************************************************

## 2020-12-15 NOTE — EDPHYS
Physician Documentation                                                                           

 [facility]                                                                 

Name: Rigoberto Faye                                                                               

Age: 30 yrs                                                                                       

Sex: Female                                                                                       

: 1990                                                                                   

MRN: [id number]                                                                                   

Arrival Date: 12/15/2020                                                                          

Time: 00:43                                                                                       

Account#: [id number]                                                                            

Bed 6                                                                                             

Private MD:                                                                                       

ED Physician Griffin Adler                                                                         

HPI:                                                                                              

12/15                                                                                             

00:53 This 30 yrs old  Female presents to ER via Unassigned with complaints of       rn  

      Heartburn.                                                                                  

00:53 The patient presents with abdominal pain in the epigastric area. Onset: The             rn  

      symptoms/episode began/occurred yesterday. The symptoms do not radiate. Associated          

      signs and symptoms: Pertinent negatives: nausea and vomiting, blood in stools, chest        

      pain, fever, shortness of breath, vomiting blood. The symptoms are described as             

      burning. Modifying factors: The symptoms are alleviated by nothing, the symptoms are        

      aggravated by food. Severity of pain: At its worst the pain was moderate in the             

      emergency department the pain is unchanged. The patient has experienced similar             

      episodes in the past. The patient has not recently seen a physician. Reports hx of          

      heartburn, multiple times in past and seen here with neg ct scan of abdomen and             

      ultrasound, states symptoms resolve after GI cocktail. Reports had popeyes last night       

      and burning pain since then. .                                                              

                                                                                                  

OB/GYN:                                                                                           

00:56 LMP 2020                                                                               

                                                                                                  

Historical:                                                                                       

- Allergies:                                                                                      

00:55 Sulfa (Sulfonamide Antibiotics);                                                          

- PMHx:                                                                                           

00:55 Bipolar disorder;                                                                         

- PSHx:                                                                                           

00:55 Tonsillectomy;                                                                            

                                                                                                  

- Immunization history:: Adult Immunizations up to date.                                          

- Social history:: Smoking status: Patient reports the use of cigarette tobacco                   

  products, smokes one pack cigarettes per day. Patient/guardian denies using alcohol,            

  street drugs.                                                                                   

- Family history:: not pertinent.                                                                 

- Hospitalizations: : No recent hospitalization is reported.                                      

                                                                                                  

                                                                                                  

ROS:                                                                                              

00:53 Constitutional: Negative for fever, chills, and weight loss, Eyes: Negative for injury, rn  

      pain, redness, and discharge, Neck: Negative for injury, pain, and swelling,                

      Cardiovascular: Negative for chest pain, palpitations, and edema, Respiratory: Negative     

      for shortness of breath, cough, wheezing, and pleuritic chest pain, Abdomen/GI:             

      Negative for vomiting, diarrhea Back: Negative for injury and pain, MS/Extremity:           

      Negative for injury and deformity, Skin: Negative for injury, rash, and discoloration,      

      Neuro: Negative for headache, weakness, numbness, tingling, and seizure.                    

                                                                                                  

Exam:                                                                                             

00:53 Constitutional:  This is a well developed, well nourished patient who is awake, alert,  rn  

      and in no acute distress. Head/Face:  Normocephalic, atraumatic. Cardiovascular:            

      Regular rate and rhythm.  No pulse deficits. Respiratory:  No increased work of             

      breathing, no retractions or nasal flaring. Abdomen/GI:  soft, mild epigastric              

      tenderness, no rebound, neg moscoso Skin:  Warm, dry MS/ Extremity:  Pulses equal, no        

      cyanosis.  Neuro:  Awake and alert, GCS 15                                                  

                                                                                                  

Vital Signs:                                                                                      

00:53  / 67; Pulse 111; Resp 18; Temp 98.2; Pulse Ox 100% on R/A; Weight 65.77 kg (M);  wh  

      Height 5 ft. 9 in. (175.26 cm);                                                             

00:53 Body Mass Index 21.41 (65.77 kg, 175.26 cm)                                             wh  

                                                                                                  

MDM:                                                                                              

00:44 Patient medically screened.                                                             rn  

01:01 Differential diagnosis: gastritis, gastroesophageal reflux disease, non-specific abd    rn  

      pain, Peptic Ulcer Disease. Data reviewed: vital signs, nurses notes, and as a result,      

      I will discharge patient. Counseling: I had a detailed discussion with the patient          

      and/or guardian regarding: the historical points, exam findings, and any diagnostic         

      results supporting the discharge/admit diagnosis, the need for outpatient follow up, to     

      return to the emergency department if symptoms worsen or persist or if there are any        

      questions or concerns that arise at home. Special discussion: I discussed with the          

      patient/guardian in detail that at this point there is no indication for admission to       

      the hospital. It is understood, however, that if the symptoms persist or worsen the         

      patient needs to return immediately for re-evaluation. Based on the history and exam        

      findings, there is no indication for further emergent testing or inpatient evaluation.      

      I discussed with the patient/guardian the need to see the gastroenterologist for            

      further evaluation of the symptoms. ED course: Reviewed old medical records indicating      

      neg ct abdomen and neg u/s gallbladder. Identical presentations in past with                

      improvement after GI cocktail. .                                                            

01:01 ED course: Had long conversation with patient regarding need to take daily antacid      rn  

      medication and GI f/u. Has not been taking daily antacids as told in past. .                

                                                                                                  

Administered Medications:                                                                         

01:02 Drug: GI Cocktail without Donnatal - (Maalox Suspension 30 ml, Lidocaine Liquid 2 % 15  wh  

      ml) Route: PO;                                                                              

:18 Follow up: Response: No adverse reaction; Marked relief of symptoms                       

01: Drug: Pepcid 20 mg Route: PO;                                                             

:18 Follow up: Response: No adverse reaction                                                  

                                                                                                  

                                                                                                  

Disposition:                                                                                      

12/15/20 01:04 Discharged to Home. Impression: Gastritis, unspecified, without bleeding.          

- Condition is Stable.                                                                            

- Discharge Instructions: Gastritis, Adult.                                                       

                                                                                                  

- Medication Reconciliation Form, Thank You Letter, Antibiotic Education, Prescription            

  Opioid Use form.                                                                                

- Follow up: Private Physician; When: As needed; Reason: Recheck today's complaints,              

  Re-evaluation by your physician.                                                                

- Problem is an acute exacerbation.                                                               

- Symptoms have improved.                                                                         

                                                                                                  

                                                                                                  

                                                                                                  

Signatures:                                                                                       

Griffin Adler MD MD rn Habalo, Winsy                                                                                   

                                                                                                  

Corrections: (The following items were deleted from the chart)                                    

00:56 00:53 Constitutional: Negative for fever, chills, and weight loss, Eyes: Negative for   rn  

      injury, pain, redness, and discharge, Neck: Negative for injury, pain, and swelling,        

      Cardiovascular: Negative for chest pain, palpitations, and edema, Respiratory: Negative     

      for shortness of breath, cough, wheezing, and pleuritic chest pain, Abdomen/GI:             

      Negative for vomiting, diarrhea Back: Negative for injury and pain, MS/Extremity:           

      Negative for injury and deformity, Skin: Negative for injury, rash, and discoloration,      

      Neuro: Negative for headache, weakness, numbness, tingling, and seizure, rn                 

: 01:04 12/15/2020 01:04 Discharged to Home. Impression: Gastritis, unspecified, without  wh  

      bleeding. Condition is Stable. Forms are Medication Reconciliation Form, Thank You          

      Letter, Antibiotic Education, Prescription Opioid Use. Follow up: Private Physician;        

      When: As needed; Reason: Recheck today's complaints, Re-evaluation by your physician.       

      Problem is an acute exacerbation. Symptoms have improved. rn                                

                                                                                                  

**************************************************************************************************

## 2020-12-17 ENCOUNTER — HOSPITAL ENCOUNTER (EMERGENCY)
Dept: HOSPITAL 97 - ER | Age: 30
Discharge: HOME | End: 2020-12-17
Payer: SELF-PAY

## 2020-12-17 VITALS — DIASTOLIC BLOOD PRESSURE: 67 MMHG | TEMPERATURE: 98.2 F | OXYGEN SATURATION: 100 % | SYSTOLIC BLOOD PRESSURE: 114 MMHG

## 2020-12-17 DIAGNOSIS — F17.210: ICD-10-CM

## 2020-12-17 DIAGNOSIS — Z88.2: ICD-10-CM

## 2020-12-17 DIAGNOSIS — R14.1: ICD-10-CM

## 2020-12-17 DIAGNOSIS — N39.0: ICD-10-CM

## 2020-12-17 DIAGNOSIS — K59.00: Primary | ICD-10-CM

## 2020-12-17 PROCEDURE — 81025 URINE PREGNANCY TEST: CPT

## 2020-12-17 PROCEDURE — 81003 URINALYSIS AUTO W/O SCOPE: CPT

## 2020-12-17 PROCEDURE — 87088 URINE BACTERIA CULTURE: CPT

## 2020-12-17 PROCEDURE — 99283 EMERGENCY DEPT VISIT LOW MDM: CPT

## 2020-12-17 PROCEDURE — 87086 URINE CULTURE/COLONY COUNT: CPT

## 2020-12-17 PROCEDURE — 96372 THER/PROPH/DIAG INJ SC/IM: CPT

## 2020-12-17 PROCEDURE — 81015 MICROSCOPIC EXAM OF URINE: CPT

## 2020-12-17 NOTE — ER
Nurse's Notes                                                                                     

 [facility]                                                                 

Name: Rigoberto Faye                                                                               

Age: 30 yrs                                                                                       

Sex: Female                                                                                       

: 1990                                                                                   

MRN: [id number]                                                                                   

Arrival Date: 2020                                                                          

Time: 12:38                                                                                       

Account#: [id number]                                                                            

Bed 14                                                                                            

Private MD:                                                                                       

Diagnosis: Urinary tract infection, site not specified;Gas pain;Epigastric pain;Constipation      

                                                                                                  

Presentation:                                                                                     

                                                                                             

13:27 Chief complaint: Patient states: "I came in about a few days ago with gastritis". Pt    aa5 

      c/o pain to upper abdomen. Pt states "I know they diagnosed me with constipation as         

      well but I haven't been able to have a normal bowel movement for about a week now". Pt      

      denies nausea/vomiting/diarrhea. Coronavirus screen: Client denies travel out of the        

      [place] in the last 14 days. At this time, the client does not indicate any symptoms           

      associated with coronavirus-19. Ebola Screen: Patient negative for fever greater than       

      or equal to 101.5 degrees Fahrenheit, and additional compatible Ebola Virus Disease         

      symptoms. Initial Sepsis Screen: Does the patient meet any 2 criteria? No. Patient's        

      initial sepsis screen is negative. Does the patient have a suspected source of              

      infection? No. Patient's initial sepsis screen is negative. Risk Assessment: Do you         

      want to hurt yourself or someone else? Patient reports no desire to harm self or            

      others. Onset of symptoms was 2020.                                                

13:27 Acuity: ANGELES 3                                                                           aa5 

13:27 Method Of Arrival: Ambulatory                                                           aa5 

                                                                                                  

OB/GYN:                                                                                           

13:33 LMP 2020                                                                          aa5 

                                                                                                  

Historical:                                                                                       

- Allergies:                                                                                      

13:32 Sulfa (Sulfonamide Antibiotics);                                                        aa5 

- PMHx:                                                                                           

13:32 Bipolar disorder;                                                                       aa5 

- PSHx:                                                                                           

13:32 Tonsillectomy;                                                                          aa5 

                                                                                                  

- Immunization history:: Adult Immunizations unknown.                                             

- Social history:: Smoking status: Patient reports the use of cigarette tobacco                   

  products, smokes one pack cigarettes per day.                                                   

                                                                                                  

                                                                                                  

Screenin:53 Abuse screen: Denies threats or abuse. Denies injuries from another. Nutritional        zb  

      screening: No deficits noted. Fall Risk None identified.                                    

16:53 Tuberculosis screening: No symptoms or risk factors identified.                         zb  

                                                                                                  

Assessment:                                                                                       

16:49 General: Appears in no apparent distress. comfortable, Behavior is calm, cooperative,   zb  

      appropriate for age, Reports fever for fatigue for. Pain: Complains of pain in left         

      upper quadrant Pain does not radiate. Quality of pain is described as aching, Pain          

      began 2-3 days ago. Is continuous. Neuro: Level of Consciousness is awake, alert, obeys     

      commands, Oriented to person, place, time, situation. Cardiovascular: Capillary refill      

      < 3 seconds in bilateral fingers Respiratory: Airway is patent Respiratory effort is        

      even, unlabored, Respiratory pattern is regular, symmetrical. GI: Abdomen is distended,     

      Bowel sounds present X 4 quads. Abdomen is tender to palpation in right upper quadrant      

      and left upper quadrant Mass noted in right upper quadrant Reports bloating,                

      constipation, gaseousness, Patient currently denies nausea, vomiting. : No signs          

      and/or symptoms were reported regarding the genitourinary system. EENT: No signs and/or     

      symptoms were reported regarding the EENT system. Derm: Skin is intact, is healthy with     

      good turgor, Skin is dry, Skin is normal. Musculoskeletal: Circulation, motion, and         

      sensation intact. Capillary refill < 3 seconds, in bilateral fingers. Range of motion:      

      intact in all extremities.                                                                  

17:49 Reassessment: Patient appears in no apparent distress at this time. Patient and/or      zb  

      family updated on plan of care and expected duration. Pain level reassessed. Patient is     

      alert, oriented x 3, equal unlabored respirations, skin warm/dry/pink. pt still c/o to      

      have bloating, states she is passing gas but no BM.                                         

18:45 Reassessment: Patient appears in no apparent distress at this time. Patient and/or      zb  

      family updated on plan of care and expected duration. Pain level reassessed. Patient is     

      alert, oriented x 3, equal unlabored respirations, skin warm/dry/pink. updated on POC.      

      d/c instruction given. waited 15 after IM injection.                                        

                                                                                                  

Vital Signs:                                                                                      

13:28  / 84; Pulse 106; Resp 18 S; Temp 98.1(O); Pulse Ox 99% on R/A; Weight 61.23 kg   aa5 

      (R); Height 5 ft. 9 in. (175.26 cm) (R); Pain 8/10;                                         

18:50  / 64; Pulse 98; Resp 18; Pulse Ox 99% on R/A;                                    zb  

13:28 Body Mass Index 19.93 (61.23 kg, 175.26 cm)                                             aa5 

                                                                                                  

ED Course:                                                                                        

12:38 Patient arrived in ED.                                                                  ag5 

13:32 Triage completed.                                                                       aa5 

13:32 Arm band placed on.                                                                     aa5 

15:28 [facility]                                                          snw 

15:28 Jonathan Ch MD is Attending Physician.                                              snw 

16:07 Valentina Moore, [place] is Primary Nurse.                                                   zb  

16:53 Patient has correct armband on for positive identification. Bed in low position. Call   zb  

      light in reach. Side rails up X 1. Adult w/ patient. Door closed. Noise minimized.          

18:50 No provider procedures requiring assistance completed.                                  zb  

18:51 Patient did not have IV access during this emergency room visit.                        zb  

                                                                                                  

Administered Medications:                                                                         

16:42 Drug: GI Cocktail without Donnatal - (Maalox Suspension 30 ml, Lidocaine Liquid 2 % 15  zb  

      ml) Route: PO;                                                                              

17:58 Follow up: Response: No adverse reaction                                                zb  

16:42 Drug: ProTONIX 40 mg Route: PO;                                                         zb  

17:58 Follow up: Response: No adverse reaction                                                zb  

16:42 Drug: Magnesium Citrate Liquid 300 ml Route: PO;                                        zb  

17:58 Follow up: Response: No adverse reaction                                                zb  

16:42 Drug: Simethicone 240 mg Route: PO;                                                     zb  

17:58 Follow up: Response: No adverse reaction                                                zb  

18:35 Drug: Rocephin (cefTRIAXone) 1 grams Route: IM; Site: right gluteus;                    zb  

18:49 Follow up: Response: No adverse reaction                                                zb  

                                                                                                  

                                                                                                  

Outcome:                                                                                          

18:06 Discharge ordered by MD.                                                                snw 

18:51 Discharged to home ambulatory, with family.                                             zb  

18:51 Condition: stable                                                                           

18:51 Discharge instructions given to patient, family, Instructed on discharge instructions,      

      follow up and referral plans. medication usage, Demonstrated understanding of               

      instructions, follow-up care, medications, Prescriptions given X 2.                         

18:53 Patient left the ED.                                                                    zb  

                                                                                                  

Signatures:                                                                                       

Zohra Zambrano FNP-C FNP-Csnw                                                  

Rosalba Cronin, RN                     RN   aa5                                                  

Orestes Jorge RN RN jl7 Gaskin, Ajare                                5                                                  

Valentina Moore RN                     RN   zb                                                   

                                                                                                  

Corrections: (The following items were deleted from the chart)                                    

15:46 15:44 Patient's name was called from ER lobby. No response. Unable to locate patient.   jlJaime 

      Will disposition as left without being seen by a provider. jl7                              

16:54 15:44 Patient left the ED. jl7                                                          jami  

                                                                                                  

**************************************************************************************************

## 2020-12-17 NOTE — EDPHYS
Physician Documentation                                                                           

 [facility]                                                                 

Name: Rigoberto Faye                                                                               

Age: 30 yrs                                                                                       

Sex: Female                                                                                       

: 1990                                                                                   

MRN: [id number]                                                                                   

Arrival Date: 2020                                                                          

Time: 12:38                                                                                       

Account#: [id number]                                                                            

Bed 14                                                                                            

Private MD:                                                                                       

ED Physician Jonathan Ch                                                                       

HPI:                                                                                              

                                                                                             

16:34 This 30 yrs old  Female presents to ER via Ambulatory with complaints of       snw 

      Constipation, Abdominal Pain.                                                               

16:34 Onset: The symptoms/episode began/occurred gradually. Associated signs and symptoms:    snw 

      Pertinent positives: constipation. Modifying factors: the patient symptoms are              

      aggravated by pt took 4 laxatives without results, ate large bowl of gumbo last pm. The     

      patient has experienced a previous episode. The patient has been recently seen at the       

      [facility], last week, for similar              

      complaints.                                                                                 

                                                                                                  

OB/GYN:                                                                                           

13:33 LMP 2020                                                                          aa5 

                                                                                                  

Historical:                                                                                       

- Allergies:                                                                                      

13:32 Sulfa (Sulfonamide Antibiotics);                                                        aa5 

- PMHx:                                                                                           

13:32 Bipolar disorder;                                                                       aa5 

- PSHx:                                                                                           

13:32 Tonsillectomy;                                                                          aa5 

                                                                                                  

- Immunization history:: Adult Immunizations unknown.                                             

- Social history:: Smoking status: Patient reports the use of cigarette tobacco                   

  products, smokes one pack cigarettes per day.                                                   

                                                                                                  

                                                                                                  

ROS:                                                                                              

16:33 Constitutional: Negative for fever, chills, and weight loss, Eyes: Negative for injury, snw 

      pain, redness, and discharge, ENT: Negative for injury, pain, and discharge, Neck:          

      Negative for injury, pain, and swelling, Cardiovascular: Negative for chest pain,           

      palpitations, and edema, Respiratory: Negative for shortness of breath, cough,              

      wheezing, and pleuritic chest pain, Back: Negative for injury and pain, : Negative        

      for injury, bleeding, discharge, and swelling, MS/Extremity: Negative for injury and        

      deformity, Skin: Negative for injury, rash, and discoloration, Neuro: Negative for          

      headache, weakness, numbness, tingling, and seizure, Psych: Negative for depression,        

      anxiety, suicide ideation, homicidal ideation, and hallucinations.                          

16:33 Abdomen/GI: Positive for abdominal pain, constipation, abdominal cramps, of the             

      epigastric area, right upper quadrant and left upper quadrant.                              

                                                                                                  

Exam:                                                                                             

16:33 Constitutional:  This is a well developed, well nourished patient who is awake, alert,  snw 

      and in no acute distress. Head/Face:  Normocephalic, atraumatic. Eyes:  Pupils equal        

      round and reactive to light, extra-ocular motions intact.  Lids and lashes normal.          

      Conjunctiva and sclera are non-icteric and not injected.  Cornea within normal limits.      

      Periorbital areas with no swelling, redness, or edema. ENT:  Nares patent. No nasal         

      discharge, no septal abnormalities noted.  Tympanic membranes are normal and external       

      auditory canals are clear.  Oropharynx with no redness, swelling, or masses, exudates,      

      or evidence of obstruction, uvula midline.  Mucous membranes moist. Neck:  Trachea          

      midline, no thyromegaly or masses palpated, and no cervical lymphadenopathy.  Supple,       

      full range of motion without nuchal rigidity, or vertebral point tenderness.  No            

      Meningismus. Chest/axilla:  Normal chest wall appearance and motion.  Nontender with no     

      deformity.  No lesions are appreciated. Cardiovascular:  Regular rate and rhythm with a     

      normal S1 and S2.  No gallops, murmurs, or rubs.  Normal PMI, no JVD.  No pulse             

      deficits. Respiratory:  Lungs have equal breath sounds bilaterally, clear to                

      auscultation and percussion.  No rales, rhonchi or wheezes noted.  No increased work of     

      breathing, no retractions or nasal flaring. Back:  No spinal tenderness.  No                

      costovertebral tenderness.  Full range of motion. Skin:  Warm, dry with normal turgor.      

      Normal color with no rashes, no lesions, and no evidence of cellulitis. MS/ Extremity:      

      Pulses equal, no cyanosis.  Neurovascular intact.  Full, normal range of motion. Neuro:     

       Awake and alert, GCS 15, oriented to person, place, time, and situation.  Cranial          

      nerves II-XII grossly intact.  Motor strength 5/5 in all extremities.  Sensory grossly      

      intact.  Cerebellar exam normal.  Normal gait. Psych:  Awake, alert, with orientation       

      to person, place and time.  Behavior, mood, and affect are within normal limits.            

16:33 Abdomen/GI: Inspection: distension, that is mild, Bowel sounds: normal, Palpation: mild     

      abdominal tenderness, moderate abdominal tenderness, in the epigastric area, right          

      upper quadrant and left upper quadrant.                                                     

                                                                                                  

Vital Signs:                                                                                      

13:28  / 84; Pulse 106; Resp 18 S; Temp 98.1(O); Pulse Ox 99% on R/A; Weight 61.23 kg   aa5 

      (R); Height 5 ft. 9 in. (175.26 cm) (R); Pain 8/10;                                         

18:50  / 64; Pulse 98; Resp 18; Pulse Ox 99% on R/A;                                    zb  

13:28 Body Mass Index 19.93 (61.23 kg, 175.26 cm)                                             aa5 

                                                                                                  

MDM:                                                                                              

15:54 Patient medically screened.                                                             snw 

18:09 Data reviewed: vital signs, nurses notes. Data interpreted: Pulse oximetry: on room air snw 

      is 99 %. Interpretation: normal. Counseling: I had a detailed discussion with the           

      patient and/or guardian regarding: the historical points, exam findings, and any            

      diagnostic results supporting the discharge/admit diagnosis, lab results, the need for      

      outpatient follow up, to return to the emergency department if symptoms worsen or           

      persist or if there are any questions or concerns that arise at home. Special               

      discussion: Based on the history and exam findings, there is no indication for further      

      emergent testing or inpatient evaluation. I discussed with the patient/guardian the         

      need to see the primary care provider for further evaluation of the symptoms.               

                                                                                                  

                                                                                             

15:46 Order name: Urine Culture                                                               snw 

                                                                                             

15:46 Order name: Urine Microscopic Only; Complete Time: 18:04                                snw 

                                                                                             

16:52 Order name: Urine Dipstick--Ancillary (enter results); Complete Time: 17:25             bd  

                                                                                             

16:52 Order name: Urine Pregnancy--Ancillary (enter results); Complete Time: 17:25            bd  

                                                                                             

15:46 Order name: Urine Pregnancy Test (obtain specimen); Complete Time: 16:43                snw 

                                                                                             

15:46 Order name: Urine Dipstick-Ancillary (obtain specimen); Complete Time: 16:43            snw 

                                                                                             

17:45 Order name: Misc. Order: Po - warmed prune, orange juice, and a pat of butter; Complete snw 

      Time: 17:47                                                                                 

                                                                                                  

Administered Medications:                                                                         

16:42 Drug: GI Cocktail without Donnatal - (Maalox Suspension 30 ml, Lidocaine Liquid 2 % 15  zb  

      ml) Route: PO;                                                                              

17:58 Follow up: Response: No adverse reaction                                                zb  

16:42 Drug: ProTONIX 40 mg Route: PO;                                                         zb  

17:58 Follow up: Response: No adverse reaction                                                zb  

16:42 Drug: Magnesium Citrate Liquid 300 ml Route: PO;                                        zb  

17:58 Follow up: Response: No adverse reaction                                                zb  

16:42 Drug: Simethicone 240 mg Route: PO;                                                     zb  

17:58 Follow up: Response: No adverse reaction                                                zb  

18:35 Drug: Rocephin (cefTRIAXone) 1 grams Route: IM; Site: right gluteus;                    zb  

18:49 Follow up: Response: No adverse reaction                                                zb  

                                                                                                  

                                                                                                  

Disposition:                                                                                      

                                                                                             

07:52 Co-signature as Attending Physician, Jonathan Ch MD I agree with the assessment and   kdr 

      plan of care.                                                                               

                                                                                                  

Disposition:                                                                                      

20 18:06 Discharged to Home. Impression: Urinary tract infection, site not specified,       

  Gas pain, Epigastric pain, Constipation.                                                        

- Condition is Stable.                                                                            

- Discharge Instructions: Abdominal Pain, Adult, Constipation, Adult, Urinary Tract               

  Infection, Adult, Rehydration, Adult.                                                           

- Prescriptions for Gas- X - take 1 unit by ORAL route 2-3 times daily; 1 box.                    

  Augmentin 875- 125 mg Oral Tablet - take 1 tablet by ORAL route every 12 hours for 10           

  days; 20 tablet.                                                                                

- Medication Reconciliation Form, Thank You Letter, Antibiotic Education, Prescription            

  Opioid Use form.                                                                                

- Follow up: Emergency Department; When: As needed; Reason: Worsening of condition.               

  Follow up: Private Physician; When: 2 - 3 days; Reason: Recheck today's complaints,             

  Continuance of care, Re-evaluation by your physician.                                           

                                                                                                  

                                                                                                  

                                                                                                  

Signatures:                                                                                       

Dispatcher [employer]                           EDMS                                                 

Jonathan Ch MD MD kdr Waters, Shelly, FNP-C                   FNP-Csnw                                                  

Rosalba Cronin, RN                     RN   [email or web address]                                                  

Orestes Jorge RN                        RN   [id number]                                                  

Valentina Moore RN                     RN   zb                                                   

                                                                                                  

Corrections: (The following items were deleted from the chart)                                    

                                                                                             

15:46 15:44 2020 15:44 Patient left the facility before being seen by provider. Reason  jl7 

      stated they are leaving due to unknown. [email or web address]                                                 

18:53 18:06 2020 18:06 Discharged to Home. Impression: Urinary tract infection, site    zb  

      not specified; Gas pain; Epigastric pain; Constipation. Condition is Stable. Forms are      

      Medication Reconciliation Form, Thank You Letter, Antibiotic Education, Prescription        

      Opioid Use. Follow up: Emergency Department; When: As needed; Reason: Worsening of          

      condition. Follow up: Private Physician; When: 2 - 3 days; Reason: Recheck today's          

      complaints, Continuance of care, Re-evaluation by your physician. petra                       

                                                                                                  

**************************************************************************************************

## 2020-12-18 VITALS — OXYGEN SATURATION: 99 % | TEMPERATURE: 98.1 F

## 2020-12-18 VITALS — SYSTOLIC BLOOD PRESSURE: 114 MMHG | DIASTOLIC BLOOD PRESSURE: 64 MMHG

## 2020-12-19 ENCOUNTER — HOSPITAL ENCOUNTER (EMERGENCY)
Dept: HOSPITAL 97 - ER | Age: 30
Discharge: ANOTHER HOSPITAL | End: 2020-12-19
Payer: SELF-PAY

## 2020-12-19 DIAGNOSIS — I31.3: Primary | ICD-10-CM

## 2020-12-19 DIAGNOSIS — I95.9: ICD-10-CM

## 2020-12-19 DIAGNOSIS — F17.210: ICD-10-CM

## 2020-12-19 DIAGNOSIS — Z20.828: ICD-10-CM

## 2020-12-19 DIAGNOSIS — Z88.2: ICD-10-CM

## 2020-12-19 LAB
ALBUMIN SERPL BCP-MCNC: 2.6 G/DL (ref 3.4–5)
ALP SERPL-CCNC: 185 U/L (ref 45–117)
ALT SERPL W P-5'-P-CCNC: 69 U/L (ref 12–78)
AST SERPL W P-5'-P-CCNC: 31 U/L (ref 15–37)
BUN BLD-MCNC: 18 MG/DL (ref 7–18)
CRP SERPL-MCNC: 84 MG/L (ref ?–3)
FERRITIN SERPL-MCNC: 165.1 NG/ML (ref 8–388)
GLUCOSE SERPLBLD-MCNC: 128 MG/DL (ref 74–106)
HCT VFR BLD CALC: 28.7 % (ref 36–45)
INR BLD: 1.29
LIPASE SERPL-CCNC: 44 U/L (ref 73–393)
LYMPHOCYTES # SPEC AUTO: 1.2 K/UL (ref 0.7–4.9)
PMV BLD: 8.8 FL (ref 7.6–11.3)
POTASSIUM SERPL-SCNC: 3.7 MMOL/L (ref 3.5–5.1)
RBC # BLD: 3.24 M/UL (ref 3.86–4.86)
TROPONIN (EMERG DEPT USE ONLY): < 0.02 NG/ML (ref 0–0.04)

## 2020-12-19 PROCEDURE — 85025 COMPLETE CBC W/AUTO DIFF WBC: CPT

## 2020-12-19 PROCEDURE — 84145 PROCALCITONIN (PCT): CPT

## 2020-12-19 PROCEDURE — 83605 ASSAY OF LACTIC ACID: CPT

## 2020-12-19 PROCEDURE — 87081 CULTURE SCREEN ONLY: CPT

## 2020-12-19 PROCEDURE — 05HP33Z INSERTION OF INFUSION DEVICE INTO RIGHT EXTERNAL JUGULAR VEIN, PERCUTANEOUS APPROACH: ICD-10-PCS

## 2020-12-19 PROCEDURE — 85379 FIBRIN DEGRADATION QUANT: CPT

## 2020-12-19 PROCEDURE — 96375 TX/PRO/DX INJ NEW DRUG ADDON: CPT

## 2020-12-19 PROCEDURE — 93005 ELECTROCARDIOGRAM TRACING: CPT

## 2020-12-19 PROCEDURE — 83690 ASSAY OF LIPASE: CPT

## 2020-12-19 PROCEDURE — 82728 ASSAY OF FERRITIN: CPT

## 2020-12-19 PROCEDURE — 84484 ASSAY OF TROPONIN QUANT: CPT

## 2020-12-19 PROCEDURE — 96365 THER/PROPH/DIAG IV INF INIT: CPT

## 2020-12-19 PROCEDURE — 76705 ECHO EXAM OF ABDOMEN: CPT

## 2020-12-19 PROCEDURE — 86140 C-REACTIVE PROTEIN: CPT

## 2020-12-19 PROCEDURE — 99285 EMERGENCY DEPT VISIT HI MDM: CPT

## 2020-12-19 PROCEDURE — 71045 X-RAY EXAM CHEST 1 VIEW: CPT

## 2020-12-19 PROCEDURE — 87070 CULTURE OTHR SPECIMN AEROBIC: CPT

## 2020-12-19 PROCEDURE — 87040 BLOOD CULTURE FOR BACTERIA: CPT

## 2020-12-19 PROCEDURE — 06HM33Z INSERTION OF INFUSION DEVICE INTO RIGHT FEMORAL VEIN, PERCUTANEOUS APPROACH: ICD-10-PCS

## 2020-12-19 PROCEDURE — 71275 CT ANGIOGRAPHY CHEST: CPT

## 2020-12-19 PROCEDURE — 85610 PROTHROMBIN TIME: CPT

## 2020-12-19 PROCEDURE — 96361 HYDRATE IV INFUSION ADD-ON: CPT

## 2020-12-19 PROCEDURE — 80048 BASIC METABOLIC PNL TOTAL CA: CPT

## 2020-12-19 PROCEDURE — 80076 HEPATIC FUNCTION PANEL: CPT

## 2020-12-19 PROCEDURE — 85730 THROMBOPLASTIN TIME PARTIAL: CPT

## 2020-12-19 PROCEDURE — 87804 INFLUENZA ASSAY W/OPTIC: CPT

## 2020-12-19 PROCEDURE — 36415 COLL VENOUS BLD VENIPUNCTURE: CPT

## 2020-12-19 NOTE — ER
Nurse's Notes                                                                                     

 [facility]                                                                 

Name: Rigoberto Faye                                                                               

Age: 30 yrs                                                                                       

Sex: Female                                                                                       

: 1990                                                                                   

MRN: [id number]                                                                                   

Arrival Date: 2020                                                                          

Time: 11:47                                                                                       

Account#: [id number]                                                                            

Bed 19                                                                                            

Private MD:                                                                                       

Diagnosis: Pericardial effusion (noninflammatory);Hypotension                                     

                                                                                                  

Presentation:                                                                                     

                                                                                             

12:01 Chief complaint: Patient states: SOB, chest tightness, subjective fever, nausea x 8     sv  

      days. Was seen here a few days ago for constipation and given meds to help and they         

      have. Ibuprofen 4 tabs taken an hr ago. Coronavirus screen: Client denies travel out of     

      the [place] in the last 14 days. Client presents with at least one sign or symptom that        

      may indicate coronavirus-19. Standard/surgical mask placed on the client. Provider          

      contacted for isolation considerations. Ebola Screen: No symptoms or risks identified       

      at this time. Risk Assessment: Do you want to hurt yourself or someone else? Patient        

      reports no desire to harm self or others. Onset of symptoms was 2020.          

12:01 Method Of Arrival: Wheelchair                                                           sv  

12:01 Acuity: ANGELES 3                                                                           sv  

12:06 Initial Sepsis Screen: Does the patient meet any 2 criteria? HR > 90 bpm. No. Patient's ll1 

      initial sepsis screen is negative. Does the patient have a suspected source of              

      infection? Yes: Productive cough/pneumonia.                                                 

12:09 Acuity: ANGELES 2                                                                           sv  

                                                                                                  

Triage Assessment:                                                                                

12:08 Respiratory: Onset: The symptoms/episode began/occurred 8 days ago, the patient has     ll1 

      mild shortness of breath.                                                                   

12:08 General: Appears ill, Behavior is calm, cooperative, appropriate for age.               ll1 

                                                                                                  

Historical:                                                                                       

- Allergies:                                                                                      

12:03 Sulfa (Sulfonamide Antibiotics);                                                        sv  

- PMHx:                                                                                           

12:03 Bipolar disorder;                                                                       sv  

- PSHx:                                                                                           

12:03 Tonsillectomy;                                                                          sv  

                                                                                                  

- Immunization history:: Flu vaccine is not up to date.                                           

- Social history:: Smoking status: Patient reports the use of cigarette tobacco                   

  products, smokes one pack cigarettes per day.                                                   

                                                                                                  

                                                                                                  

Screenin:05 Abuse screen: Denies threats or abuse. Nutritional screening: No deficits noted.        ll1 

      Tuberculosis screening: No symptoms or risk factors identified.                             

13:42 Fall Risk IV access (20 points). Gait- Weak (10 pts.). Total Wilkins Fall Scale indicates ll1 

      Low Risk Score (25-44 pts). Fall prevention measures have been instituted. Side Rails       

      Up X 2 Placed close to Nursing Station Frequent Obs/Assesments occuring Family Present      

      and informed to notify staff if they need to leave bedside As available Patient and         

      Family Educated on Fall Prevention Program and strategies.                                  

                                                                                                  

Assessment:                                                                                       

12:06 Pain: Complains of pain in chest Pain currently is 8 out of 10 on a pain scale. Quality ll1 

      of pain is described as aching, Is continuous. Cardiovascular: Reports chest pain,          

      Heart tones S1 S2 Capillary refill < 3 seconds Clubbing of nail beds is absent              

      Patient's skin is warm and dry. Pulses are all present. Chest pain is described as          

      vague, is located in anterior chest wall. Cardiovascular: Rhythm is sinus tachycardia.      

      Respiratory: Airway is patent Trachea midline Respiratory effort is even, unlabored.        

      GI: Abdomen is flat, Bowel sounds present X 4 quads. Abd is soft and non tender X 4         

      quads. Reports nausea, vomiting. :. Musculoskeletal: Circulation, motion, and             

      sensation intact. Capillary refill < 3 seconds, Range of motion: intact in all              

      extremities, Reports weakness in generalized weakness.                                      

12:06 Respiratory: Breath sounds are clear bilaterally.                                       ll1 

13:05 Reassessment: No changes from previously documented assessment. Patient and/or family   ll1 

      updated on plan of care and expected duration. Pain level reassessed.                       

14:07 Reassessment: Provider notified of patients blood pressure.                             vg1 

14:07 Reassessment: V/O received to administer a bolus of NS and to position patient in       vg1 

      Trendelenburg.                                                                              

15:00 Reassessment: Dr Adler at the bedside attempting to place an IV for CT.                 sv  

16:02 Reassessment: No changes from previously documented assessment. Patient and/or family   vg1 

      updated on plan of care and expected duration. Pain level reassessed. Patient is alert,     

      oriented x 3, equal unlabored respirations, skin warm/dry/pink.                             

17:00 Reassessment: No changes from previously documented assessment. Patient and/or family   vg1 

      updated on plan of care and expected duration. Pain level reassessed. Patient is alert,     

      oriented x 3, equal unlabored respirations, skin warm/dry/pink.                             

17:24 Reassessment: Patient reports dry mouth. Applied lemon glycerin.                        vg1 

18:30 Reassessment: Attempted to call report to [facility], but nurse     sv  

      unavailable at this time.                                                                   

18:52 Reassessment: Attempted to call report, nurse unavailable.                              sv  

18:55 Reassessment: Gave report to [facility].                                          vg1 

                                                                                                  

Vital Signs:                                                                                      

12:01 Weight 65.77 kg; Height 5 ft. 9 in. (175.26 cm);                                        sv  

12:05  / 63; Pulse 114; Resp 18; Temp 98.5; Pulse Ox 98% on R/A; Pain 8/10;             ll1 

13:33 BP 96 / 73; Pulse 112; Resp 18;                                                         ll1 

14:00 BP 85 / 56; Pulse 115; Resp 18; Pulse Ox 98% on R/A;                                    vg1 

14:06 BP 76 / 62; Pulse 113; Resp 18; Pulse Ox 98% on R/A;                                    vg1 

14:15 BP 88 / 68; Pulse 113; Resp 18; Pulse Ox 95% on R/A;                                    vg1 

14:30 BP 96 / 61; Pulse 115; Resp 18; Pulse Ox 96% on R/A;                                    vg1 

15:00 BP 85 / 57; Pulse 113; Resp 18; Pulse Ox 97% on R/A;                                    vg1 

15:15 BP 98 / 68; Pulse 111; Resp 18; Pulse Ox 98% on R/A;                                    vg1 

16:00 BP 95 / 59; Pulse 110; Resp 20; Pulse Ox 98% on R/A;                                    vg1 

16:15 BP 81 / 56; Pulse 116; Resp 20; Pulse Ox 98% on R/A;                                    vg1 

16:30 BP 83 / 65; Pulse 113; Resp 20; Pulse Ox 98% on R/A;                                    vg1 

16:42 BP 96 / 69;                                                                             kb  

16:45 BP 95 / 60; Pulse 112; Resp 22; Pulse Ox 95% on R/A;                                    vg1 

17:20  / 66; Pulse 113; Resp 22; Pulse Ox 95% on R/A;                                   vg1 

17:30 BP 92 / 65; Pulse 112; Resp 24; Pulse Ox 96% on R/A;                                    vg1 

17:45 BP 88 / 62; Pulse 109; Resp 24; Pulse Ox 98% on R/A;                                    vg1 

18:00 BP 91 / 65; Pulse 111; Resp 24; Pulse Ox 94% on R/A;                                    vg1 

18:15 BP 87 / 59; Pulse 113; Resp 24; Pulse Ox 98% on R/A;                                    vg1 

18:30 BP 89 / 77; Pulse 115; Resp 24; Pulse Ox 95% on R/A;                                    vg1 

18:45 BP 90 / 67; Pulse 110; Resp 28; Pulse Ox 97% on R/A;                                    vg1 

12:01 Body Mass Index 21.41 (65.77 kg, 175.26 cm)                                               

                                                                                                  

ED Course:                                                                                        

11:47 Patient arrived in ED.                                                                  ds1 

11:50 Marilee Antonoi FNP-C is PHCP.                                                        kb  

11:50 Griffin Adler MD is Attending Physician.                                                kb  

12:00 Arm band placed on.                                                                     sv  

12:03 Triage completed.                                                                       sv  

12:05 Otis Barrett, RN is Primary Nurse.                                                     ll1 

12:08 Patient has correct armband on for positive identification. Bed in low position. Call   ll1 

      light in reach. Side rails up X 1. Pulse ox on. NIBP on.                                    

13:00 Missed attempt(s): 22 gauge in right antecubital area. Bleeding controlled, band aid    ll1 

      applied, catheter tip intact.                                                               

13:15 Missed attempt(s): 24 gauge in left upper arm. Bleeding controlled, band aid applied,   ll1 

      catheter tip intact.                                                                        

13:20 Inserted saline lock: 18 gauge in right EJ, using aseptic technique. Blood collected.   ll1 

13:45 CXR XRAY In Process Unspecified.                                                        EDMS

14:16 Primary Nurse role handed off by Otis Barrett, RN                                      vg1 

14:16 Kirstie Cavazos, RN is Primary Nurse.                                                  vg1 

14:25 Missed attempt(s): 20 gauge in right antecubital area. Bleeding controlled, band aid    sv  

      applied, catheter tip intact.                                                               

14:35 Missed attempt(s): 20 gauge in right upper arm. Bleeding controlled, band aid applied,  sv  

      catheter tip intact.                                                                        

15:00 Accessed peripheral vein via ultrasound, utilizing dynamic ultrasound technique done by sv  

      Dr Adler Clean \T\ dry. Dressing intact. Good blood return. Flushes easily. 22 G            

      diffusics.                                                                                  

15:34 CT Chest For PE Angio In Process Unspecified.                                           EDMS

15:53 US at bedside.                                                                          vg1 

16:17 US Abdomen Limited In Process Unspecified.                                              EDMS

16:28 initiated a transfer with JOMAR Hendrickson from the [facility]/ Per JOMAR they are eb  

      at ICU and PCU capacity and have to deny the patient in transfer.                           

16:31 initiated a transfer with Jacinta from the [facility].                eb  

17:49 administrative approval given by Missy Diggs/ patient has been accepted to [facility]/ Report to be called to [phone]/ Dr. Ervin has accepted     

      the patient in transfer.                                                                    

18:15 Assisted provider with central line placement. Set up central line tray.                vg1 

19:07 Patient transferred, IV remains in place. intact.                                       sv  

                                                                                                  

Administered Medications:                                                                         

13:31 Drug: Zofran (Ondansetron) 4 mg Route: IVP; Site: Other;                                ll1 

13:50 Follow up: Response: Nausea is decreased                                                vg1 

13:32 Drug: NS 0.9% 1000 ml Route: IV; Rate: 1000 ml; Site: Other;                            ll1 

16:15 Follow up: IV Status: Completed infusion; IV Intake: 1000ml                             vg1 

13:40 Drug: fentaNYL (PF) 25 mcg Route: IVP; Site: Other;                                     ll1 

14:15 Follow up: Response: Pain is unchanged, physician notified                              vg1 

14:35 Drug: NS 0.9% 1000 ml Route: IV; Rate: 1000 ml; Site: right jugular;                    vg1 

15:35 Follow up: IV Status: Completed infusion; IV Intake: 1000ml                             vg1 

16:29 Drug: NS 0.9% 1000 ml Route: IV; Rate: 1000 ml; Site: right jugular;                    vg1 

17:30 Follow up: IV Status: Completed infusion; IV Intake: 1000ml                             vg1 

17:49 Drug: vancoMYCIN 1 grams Route: IVPB; Infused Over: 2 hrs; Site: right jugular;         vg1 

19:17 Follow up: IV Status: Infusion continued upon transfer                                  vg1 

                                                                                                  

                                                                                                  

Intake:                                                                                           

15:35 IV: 1000ml; Total: 1000ml.                                                              vg1 

16:15 IV: 1000ml; Total: 2000ml.                                                              vg1 

17:30 IV: 1000ml; Total: 3000ml.                                                              vg1 

                                                                                                  

Outcome:                                                                                          

17:53 ER care complete, transfer ordered by MD.                                               kb  

19:08 Transferred by ground EMS Transfer form completed. X-rays sent w/ patient. Note:        sv  

      Report given to Trish CHOWDHURY at [facility]                                   

19:08 Condition: stable                                                                           

19:08 Instructed on the need for transfer.                                                        

19:12 Patient left the ED.                                                                    mg2 

                                                                                                  

Signatures:                                                                                       

Dispatcher [employer]                           EDMarilee Waterman, FNP-C                 FNP-Ckb                                                   

Chantel Schafer, RN                    RN   sv                                                   

Beatriz Saavedra                                ds1                                                  

Becki Valdez Michele, RN RN   mg2                                                  

Kirstie Cavazos RN RN   1                                                  

Otis Barrett RN                       RN   ll1                                                  

                                                                                                  

Corrections: (The following items were deleted from the chart)                                    

13:43 13:42 Respiratory: Breath sounds are clear bilaterally. ll1                             ll1 

                                                                                                  

**************************************************************************************************

## 2020-12-19 NOTE — RAD REPORT
EXAM DESCRIPTION:  US - Abdomen Exam Limited - 12/19/2020 4:17 pm

 

CLINICAL HISTORY:  ABD PAIN

 

COMPARISON:  Abdomen   Pelvis W Contrast dated 9/10/2018; Chest For Pe Angio dated 12/19/2020

 

FINDINGS:  Several small less than 5 millimeter sized mobile gallstones are present. Gallbladder wall
 is thickened and edematous. Small amount of pericholecystic fluid is present.

 

No common duct stone or biliary tree dilatation identified.

 

IMPRESSION:  Several small less than 5 mm sized mobile gallstones are seen.

 

Pericholecystic fluid and gallbladder wall thickening are present.  Given the pericardial and pleural
 effusions, gallbladder wall changes may reflect response to systemic disease rather than acute wilbert
cystitis.

## 2020-12-19 NOTE — RAD REPORT
EXAM DESCRIPTION:  RAD - Chest Single View - 12/19/2020 1:45 pm

 

CLINICAL HISTORY:  Fever;Cough;Chest pain

 

COMPARISON:  None

 

TECHNIQUE:  AP portable chest image was obtained 12/19/2020 1:45 pm .

 

FINDINGS:  Lungs are clear. Mildly prominent interstitial pattern seen. No dense consolidation. Bilat
eral pleural effusions are present with lung base atelectasis. No pneumothorax. No acute bony abnorma
lity seen. No acute aortic findings suspected.

 

IMPRESSION:  Small bilateral pleural effusions with lung base atelectasis.

 

Mild interstitial opacification. No definitive bacterial or COVID-19 pneumonia findings.

## 2020-12-19 NOTE — EDPHYS
Physician Documentation                                                                           

 [facility]                                                                 

Name: Rigoberto Faye                                                                               

Age: 30 yrs                                                                                       

Sex: Female                                                                                       

: 1990                                                                                   

MRN: [id number]                                                                                   

Arrival Date: 2020                                                                          

Time: 11:47                                                                                       

Account#: [id number]                                                                            

Bed 19                                                                                            

Private MD:                                                                                       

ED Physician Griffin Adler                                                                         

HPI:                                                                                              

                                                                                             

16:16 This 30 yrs old  Female presents to ER via Wheelchair with complaints of       kb  

      Fever, Shortness Of Breath, Chest Pain.                                                     

16:16 The patient or guardian reports cough, that is intermittent, described as mild, with no kb  

      sputum, difficulty breathing, flu symptoms, low-grade fever. Onset: The                     

      symptoms/episode began/occurred 8 day(s) ago. Severity of symptoms: At their worst the      

      symptoms were moderate, in the emergency department the symptoms are unchanged.             

      Modifying factors: The symptoms are alleviated by nothing, the symptoms are aggravated      

      by nothing. Associated signs and symptoms: Pertinent positives: chest pain, fever,          

      nausea, vomiting, Pertinent negatives: diarrhea, ear ache, rhinorrhea, sore throat. The     

      patient has not experienced similar symptoms in the past. The patient has been recently     

      seen at the [facility] Emergency Department, this week, for          

      unrelated complaints, seen for constipation last week. Pt reports cough, shortness of       

      breath, chest tightness, fever/chills, malaise for 8 days. Nausea and vomiting started      

      today. Reports other symptoms have gotten worse.                                            

                                                                                                  

Historical:                                                                                       

- Allergies:                                                                                      

12:03 Sulfa (Sulfonamide Antibiotics);                                                        sv  

- PMHx:                                                                                           

12:03 Bipolar disorder;                                                                       sv  

- PSHx:                                                                                           

12:03 Tonsillectomy;                                                                          sv  

                                                                                                  

- Immunization history:: Flu vaccine is not up to date.                                           

- Social history:: Smoking status: Patient reports the use of cigarette tobacco                   

  products, smokes one pack cigarettes per day.                                                   

                                                                                                  

                                                                                                  

ROS:                                                                                              

16:16 Back: Negative for injury and pain, MS/Extremity: Negative for injury and deformity,    kb  

      Skin: Negative for injury, rash, and discoloration.                                         

16:16 Constitutional: Positive for chills, fever, malaise.                                        

16:16 Cardiovascular: Positive for chest pain.                                                    

16:16 Respiratory: Positive for shortness of breath.                                              

16:16 Abdomen/GI: Positive for nausea and vomiting.                                               

16:16 Neuro: Positive for headache.                                                               

                                                                                                  

Exam:                                                                                             

16:41 Head/Face:  Normocephalic, atraumatic. Chest/axilla:  Normal chest wall appearance and  kb  

      motion.  Nontender with no deformity.  No lesions are appreciated. Respiratory:  Lungs      

      have equal breath sounds bilaterally, clear to auscultation and percussion.  No rales,      

      rhonchi or wheezes noted.  No increased work of breathing, no retractions or nasal          

      flaring. Abdomen/GI:  Soft, non-tender, with normal bowel sounds.  No distension or         

      tympany.  No guarding or rebound.  No evidence of tenderness throughout. Skin:  Warm,       

      dry with normal turgor.  Normal color with no rashes, no lesions, and no evidence of        

      cellulitis. MS/ Extremity:  Pulses equal, no cyanosis.  Neurovascular intact.  Full,        

      normal range of motion. Neuro:  Awake and alert, GCS 15, oriented to person, place,         

      time, and situation.  Cranial nerves II-XII grossly intact.  Motor strength 5/5 in all      

      extremities.  Sensory grossly intact.  Cerebellar exam normal.  Normal gait.                

16:41 Cardiovascular: Rate: tachycardic, actual rate is  115 bpm, Rhythm: regular, Pulses: no     

      pulse deficits are appreciated, Heart sounds: normal, murmur, not appreciated, S1, S2.      

16:41 Constitutional: The patient appears alert, awake, uncomfortable.                        kb  

                                                                                                  

Vital Signs:                                                                                      

12:01 Weight 65.77 kg; Height 5 ft. 9 in. (175.26 cm);                                        sv  

12:05  / 63; Pulse 114; Resp 18; Temp 98.5; Pulse Ox 98% on R/A; Pain 8/10;             ll1 

13:33 BP 96 / 73; Pulse 112; Resp 18;                                                         ll1 

14:00 BP 85 / 56; Pulse 115; Resp 18; Pulse Ox 98% on R/A;                                    vg1 

14:06 BP 76 / 62; Pulse 113; Resp 18; Pulse Ox 98% on R/A;                                    vg1 

14:15 BP 88 / 68; Pulse 113; Resp 18; Pulse Ox 95% on R/A;                                    vg1 

14:30 BP 96 / 61; Pulse 115; Resp 18; Pulse Ox 96% on R/A;                                    vg1 

15:00 BP 85 / 57; Pulse 113; Resp 18; Pulse Ox 97% on R/A;                                    vg1 

15:15 BP 98 / 68; Pulse 111; Resp 18; Pulse Ox 98% on R/A;                                    vg1 

16:00 BP 95 / 59; Pulse 110; Resp 20; Pulse Ox 98% on R/A;                                    vg1 

16:15 BP 81 / 56; Pulse 116; Resp 20; Pulse Ox 98% on R/A;                                    vg1 

16:30 BP 83 / 65; Pulse 113; Resp 20; Pulse Ox 98% on R/A;                                    vg1 

16:42 BP 96 / 69;                                                                             kb  

16:45 BP 95 / 60; Pulse 112; Resp 22; Pulse Ox 95% on R/A;                                    vg1 

17:20  / 66; Pulse 113; Resp 22; Pulse Ox 95% on R/A;                                   vg1 

17:30 BP 92 / 65; Pulse 112; Resp 24; Pulse Ox 96% on R/A;                                    vg1 

17:45 BP 88 / 62; Pulse 109; Resp 24; Pulse Ox 98% on R/A;                                    vg1 

18:00 BP 91 / 65; Pulse 111; Resp 24; Pulse Ox 94% on R/A;                                    vg1 

18:15 BP 87 / 59; Pulse 113; Resp 24; Pulse Ox 98% on R/A;                                    vg1 

18:30 BP 89 / 77; Pulse 115; Resp 24; Pulse Ox 95% on R/A;                                    vg1 

18:45 BP 90 / 67; Pulse 110; Resp 28; Pulse Ox 97% on R/A;                                    vg1 

12:01 Body Mass Index 21.41 (65.77 kg, 175.26 cm)                                             sv  

                                                                                                  

Procedures:                                                                                       

13:25 Peripheral line: by aseptic technique a peripheral line was placed in the right         kb  

      external jugular vein.                                                                      

15:04 Peripheral line: U/S guided IV placed left bicep region by Dr. Adler, single stick, 22g rn  

      diffusics.                                                                                  

18:51 Central Line: the site was prepped with Betadine, in sterile fashion, a triple lumen    cp  

      catheter was inserted, in the right femoral vein, in 1 attempts. placement was              

      verified, by blood return, the site was dressed with using sterile technique, the           

      patient tolerated the procedure, well.                                                      

                                                                                                  

MDM:                                                                                              

11:53 Patient medically screened.                                                             kb  

15:00 Data reviewed: vital signs, nurses notes. Data interpreted: Pulse oximetry: on room air kb  

      is 96 %. Interpretation: normal.                                                            

16:19 Counseling: I had a detailed discussion with the patient and/or guardian regarding: the kb  

      historical points, exam findings, and any diagnostic results supporting the                 

      discharge/admit diagnosis, lab results, radiology results, the need to transfer to          

      another facility, for higher level of care, [facility] does not           

      immediately have the required specialist.                                                   

17:07 ED course: [facility]. Transfer initiated to [facility]. I spoke to        

      thoracic surgeon. Awaiting callback about bed availability.                                 

17:52 ED course: Pt accepted by Zbigniew Schuler at [facility]  

      location to the SICU.                                                                       

                                                                                                  

                                                                                             

12:07 Order name: Blood Culture Adult (2)                                                     kb  

                                                                                             

12:07 Order name: BMP; Complete Time: 14:09                                                   kb  

                                                                                             

12:07 Order name: C-Reactive Protein; Complete Time: 14:09                                    kb  

                                                                                             

12:07 Order name: CBC with Diff; Complete Time: 13:49                                         kb  

                                                                                             

12:07 Order name: D-Dimer; Complete Time: 13:52                                               kb  

                                                                                             

12:07 Order name: Ferritin; Complete Time: 14:09                                              kb  

                                                                                             

12:07 Order name: Flu; Complete Time: 12:56                                                   kb  

                                                                                             

12:07 Order name: Lactate; Complete Time: 14:09                                               kb  

                                                                                             

12:07 Order name: LFT's; Complete Time: 14:09                                                 kb  

                                                                                             

12:07 Order name: Lipase; Complete Time: 14:09                                                kb  

                                                                                             

12:07 Order name: Procalcitonin; Complete Time: 14:09                                         kb  

                                                                                             

12:07 Order name: PT-INR; Complete Time: 13:49                                                kb  

                                                                                             

12:07 Order name: Ptt, Activated; Complete Time: 13:49                                        kb  

                                                                                             

12:07 Order name: Strep; Complete Time: 12:56                                                 kb  

                                                                                             

12:07 Order name: Troponin (emerg Dept Use Only); Complete Time: 14:09                        kb  

                                                                                             

12:07 Order name: CXR XRAY; Complete Time: 14:18                                              kb  

                                                                                             

12:07 Order name: EKG; Complete Time: 12:09                                                   kb  

                                                                                             

12:49 Order name: Throat Culture                                                              EDMS

                                                                                             

15:01 Order name: CT Chest For PE Angio; Complete Time: 16:07                                 kb  

                                                                                             

15:10 Order name: US Abdomen Limited; Complete Time: 17:03                                    kb  

                                                                                             

17:05 Order name: SARS-COV-2 RT PCR; Complete Time: 17:06                                     EDMS

                                                                                             

12:07 Order name: Cardiac monitoring; Complete Time: 12:10                                    kb  

                                                                                             

12:07 Order name: Droplet/Contact Precautions; Complete Time: 12:10                           kb  

                                                                                             

12:07 Order name: EKG - Nurse/Tech; Complete Time: 13:32                                      kb  

                                                                                             

12:07 Order name: IV Start; Complete Time: 12:10                                              kb  

                                                                                             

12:07 Order name: Labs collected and sent; Complete Time: 12:10                               kb  

                                                                                             

12:07 Order name: O2 Per Protocol; Complete Time: 12:09                                       kb  

                                                                                             

12:07 Order name: O2 Sat Monitoring; Complete Time: 12:09                                     kb  

                                                                                                  

Administered Medications:                                                                         

13:31 Drug: Zofran (Ondansetron) 4 mg Route: IVP; Site: Other;                                ll1 

13:50 Follow up: Response: Nausea is decreased                                                vg1 

13:32 Drug: NS 0.9% 1000 ml Route: IV; Rate: 1000 ml; Site: Other;                            ll1 

16:15 Follow up: IV Status: Completed infusion; IV Intake: 1000ml                             vg1 

13:40 Drug: fentaNYL (PF) 25 mcg Route: IVP; Site: Other;                                     ll1 

14:15 Follow up: Response: Pain is unchanged, physician notified                              vg1 

14:35 Drug: NS 0.9% 1000 ml Route: IV; Rate: 1000 ml; Site: right jugular;                    vg1 

15:35 Follow up: IV Status: Completed infusion; IV Intake: 1000ml                             vg1 

16:29 Drug: NS 0.9% 1000 ml Route: IV; Rate: 1000 ml; Site: right jugular;                    vg1 

17:30 Follow up: IV Status: Completed infusion; IV Intake: 1000ml                             vg1 

17:49 Drug: vancoMYCIN 1 grams Route: IVPB; Infused Over: 2 hrs; Site: right jugular;         vg1 

19:17 Follow up: IV Status: Infusion continued upon transfer                                  vg1 

                                                                                                  

                                                                                                  

Disposition:                                                                                      

                                                                                             

07:33 Co-signature as Attending Physician, Griffin Adler MD.                                    rn  

                                                                                                  

Disposition:                                                                                      

20 17:53 Transfer ordered to [facility]. Diagnosis are Pericardial effusion    

  (noninflammatory), Hypotension.                                                                 

- Reason for transfer: Higher level of care.                                                      

- Accepting physician is Zbigniew Ervin.                                                     

- Condition is Serious.                                                                           

- Problem is new.                                                                                 

- Symptoms are unchanged.                                                                         

                                                                                                  

                                                                                                  

                                                                                                  

Signatures:                                                                                       

Dispatcher [employer]                           EDMS                                                 

Marilee Antonio, AMINTA-C                 FNP-CkChantel Suresh, RN                    RN   Griffin Harrison MD MD rn Page, Corey, PA PA cp Gardose, Michele, RN                    RN   mg2                                                  

Kirstie Cavazos, RN                    RN   vg1                                                  

Otis Barrett, RN                       RN   ll1                                                  

                                                                                                  

Corrections: (The following items were deleted from the chart)                                    

                                                                                             

12:10 12:07 Document PUI# ordered. kb                                                         ll1 

12:10 12:07 Notify [facility] [phone] ordered. kb                     ll1 

16:17 12:09 CORONAVIRUS+MR.LAB.BRZ ordered. [facility]                                              EDMS

17:58 17:53 2020 17:53 Transfer ordered to [facility]. Diagnosis is        kb  

      Pericardial effusion (noninflammatory). Reason for transfer: Higher level of care.          

      Accepting physician is Zbigniew Ervin. Condition is Serious. Problem is new.           

      Symptoms are unchanged.                                                                   

19:12 17:58 2020 17:53 Transfer ordered to [facility]. Diagnosis is        mg2 

      Pericardial effusion (noninflammatory); Hypotension. Reason for transfer: Higher level      

      of care. Accepting physician is Zbigniew Ervin. Condition is Serious. Problem is       

      new. Symptoms are unchanged. kb                                                             

                                                                                                  

**************************************************************************************************

## 2020-12-19 NOTE — RAD REPORT
EXAM DESCRIPTION:  CT - Chest For Pe Angio - 12/19/2020 3:34 pm

 

CLINICAL HISTORY:   Chest pain;Dyspnea

 

COMPARISON:  Chest Single View dated 12/19/2020

 

TECHNIQUE:  Dynamically enhanced 3 mm thick images of the chest were obtained during administration o
f approximately 150mL Isovue 370 IV contrast. Coronal and oblique MIP reconstruction images were gene
rated and reviewed. Exam utilizes a protocol to evaluate the pulmonary arterial tree.

 

All CT scans are performed using dose optimization technique as appropriate and may include automated
 exposure control or mA/KV adjustment according to patient size.

 

FINDINGS:  No pulmonary emboli are identified.

 

The aorta as imaged shows no acute or suspicious finding. Large pericardial effusion is present up to
 17 mm in thickness. No cardiomegaly. Questionable left ventricular hypertrophy noted. CT sensitivity
 is limited in myocardial hypertrophy assessment.

 

Moderate bilateral pleural effusions are present. There is lung base atelectasis. Infiltrate changes 
in the superior aspect of each lower lobe not excluded. Minimal atelectasis in the lingula. No pneumo
thorax. No pleural based mass.

 

No mediastinal or hilar suspicious masses. No chest wall masses or abnormal axillary lymphadenopathy.


 

IMPRESSION:  No pulmonary emboli identified.

 

Large pericardial effusion up to 17 mm in thickness with questionable left ventricular myocardial hyp
ertrophy.

 

Moderate bilateral pleural effusions with lung base atelectasis. Infiltrates of the superior aspect e
ach lower lobe not excluded.

## 2020-12-21 NOTE — EKG
Test Date:    2020-12-19               Test Time:    12:55:41

Technician:   CHARLY                                    

                                                     

MEASUREMENT RESULTS:                                       

Intervals:                                           

Rate:         0                                      

IA:                                                  

QRSD:         0                                      

QT:           0                                      

QTc:          0                                      

Axis:                                                

P:                                                   

IA:                                                  

QRS:          0                                      

T:            0                                      

                                                     

INTERPRETIVE STATEMENTS:                                       

                                                     

** No QRS complexes found, no ECG analysis possible **

No previous ECG available for comparison



Electronically Signed On 12-21-20 07:34:41 CST by Memo Wiggins

## 2020-12-22 VITALS — SYSTOLIC BLOOD PRESSURE: 90 MMHG | DIASTOLIC BLOOD PRESSURE: 67 MMHG | OXYGEN SATURATION: 97 %

## 2020-12-22 VITALS — TEMPERATURE: 98.5 F
